# Patient Record
Sex: MALE | Race: WHITE | Employment: FULL TIME | URBAN - METROPOLITAN AREA
[De-identification: names, ages, dates, MRNs, and addresses within clinical notes are randomized per-mention and may not be internally consistent; named-entity substitution may affect disease eponyms.]

---

## 2017-05-29 ENCOUNTER — HOSPITAL ENCOUNTER (EMERGENCY)
Facility: HOSPITAL | Age: 52
Discharge: HOME | End: 2017-05-29
Attending: EMERGENCY MEDICINE
Payer: COMMERCIAL

## 2017-05-29 VITALS
HEIGHT: 70 IN | BODY MASS INDEX: 27.06 KG/M2 | WEIGHT: 189 LBS | RESPIRATION RATE: 19 BRPM | TEMPERATURE: 99 F | DIASTOLIC BLOOD PRESSURE: 82 MMHG | OXYGEN SATURATION: 98 % | HEART RATE: 118 BPM | SYSTOLIC BLOOD PRESSURE: 135 MMHG

## 2017-05-29 DIAGNOSIS — J18.9 PNEUMONIA OF RIGHT MIDDLE LOBE DUE TO INFECTIOUS ORGANISM: Primary | ICD-10-CM

## 2017-05-29 LAB
ALBUMIN SERPL BCP-MCNC: 3.4 G/DL
ALP SERPL-CCNC: 88 U/L
ALT SERPL W/O P-5'-P-CCNC: 11 U/L
ANION GAP SERPL CALC-SCNC: 12 MMOL/L
AST SERPL-CCNC: 9 U/L
B-OH-BUTYR BLD STRIP-SCNC: 0.1 MMOL/L
BACTERIA #/AREA URNS AUTO: NORMAL /HPF
BASOPHILS # BLD AUTO: 0.02 K/UL
BASOPHILS NFR BLD: 0.2 %
BILIRUB SERPL-MCNC: 0.6 MG/DL
BILIRUB UR QL STRIP: NEGATIVE
BNP SERPL-MCNC: 26 PG/ML
BUN SERPL-MCNC: 12 MG/DL
CALCIUM SERPL-MCNC: 10 MG/DL
CHLORIDE SERPL-SCNC: 102 MMOL/L
CLARITY UR REFRACT.AUTO: CLEAR
CO2 SERPL-SCNC: 24 MMOL/L
COLOR UR AUTO: YELLOW
CREAT SERPL-MCNC: 1.2 MG/DL
DIFFERENTIAL METHOD: ABNORMAL
EOSINOPHIL # BLD AUTO: 0.1 K/UL
EOSINOPHIL NFR BLD: 0.7 %
ERYTHROCYTE [DISTWIDTH] IN BLOOD BY AUTOMATED COUNT: 12.1 %
EST. GFR  (AFRICAN AMERICAN): >60 ML/MIN/1.73 M^2
EST. GFR  (NON AFRICAN AMERICAN): >60 ML/MIN/1.73 M^2
GLUCOSE SERPL-MCNC: 278 MG/DL
GLUCOSE UR QL STRIP: ABNORMAL
HCT VFR BLD AUTO: 38.1 %
HGB BLD-MCNC: 13.4 G/DL
HGB UR QL STRIP: ABNORMAL
INR PPP: 1.1
KETONES UR QL STRIP: NEGATIVE
LACTATE SERPL-SCNC: 1.8 MMOL/L
LEUKOCYTE ESTERASE UR QL STRIP: NEGATIVE
LYMPHOCYTES # BLD AUTO: 2.1 K/UL
LYMPHOCYTES NFR BLD: 16.7 %
MCH RBC QN AUTO: 30.5 PG
MCHC RBC AUTO-ENTMCNC: 35.2 %
MCV RBC AUTO: 87 FL
MICROSCOPIC COMMENT: NORMAL
MONOCYTES # BLD AUTO: 0.7 K/UL
MONOCYTES NFR BLD: 5.8 %
NEUTROPHILS # BLD AUTO: 9.4 K/UL
NEUTROPHILS NFR BLD: 76.4 %
NITRITE UR QL STRIP: NEGATIVE
PH UR STRIP: 5 [PH] (ref 5–8)
PLATELET # BLD AUTO: 243 K/UL
PMV BLD AUTO: 8.7 FL
POCT GLUCOSE: 290 MG/DL (ref 70–110)
POTASSIUM SERPL-SCNC: 4.3 MMOL/L
PROT SERPL-MCNC: 7.9 G/DL
PROT UR QL STRIP: NEGATIVE
PROTHROMBIN TIME: 12 SEC
RBC # BLD AUTO: 4.39 M/UL
RBC #/AREA URNS AUTO: 1 /HPF (ref 0–4)
SODIUM SERPL-SCNC: 138 MMOL/L
SP GR UR STRIP: >=1.03 (ref 1–1.03)
TROPONIN I SERPL DL<=0.01 NG/ML-MCNC: <0.006 NG/ML
URN SPEC COLLECT METH UR: ABNORMAL
UROBILINOGEN UR STRIP-ACNC: NEGATIVE EU/DL
WBC # BLD AUTO: 12.34 K/UL
WBC #/AREA URNS AUTO: 0 /HPF (ref 0–5)
YEAST UR QL AUTO: NORMAL

## 2017-05-29 PROCEDURE — 80053 COMPREHEN METABOLIC PANEL: CPT

## 2017-05-29 PROCEDURE — 93010 ELECTROCARDIOGRAM REPORT: CPT | Mod: ,,, | Performed by: INTERNAL MEDICINE

## 2017-05-29 PROCEDURE — 83605 ASSAY OF LACTIC ACID: CPT

## 2017-05-29 PROCEDURE — 63600175 PHARM REV CODE 636 W HCPCS: Performed by: EMERGENCY MEDICINE

## 2017-05-29 PROCEDURE — 85610 PROTHROMBIN TIME: CPT

## 2017-05-29 PROCEDURE — 83880 ASSAY OF NATRIURETIC PEPTIDE: CPT

## 2017-05-29 PROCEDURE — 25000003 PHARM REV CODE 250: Performed by: EMERGENCY MEDICINE

## 2017-05-29 PROCEDURE — 93005 ELECTROCARDIOGRAM TRACING: CPT

## 2017-05-29 PROCEDURE — 99285 EMERGENCY DEPT VISIT HI MDM: CPT | Mod: ,,, | Performed by: EMERGENCY MEDICINE

## 2017-05-29 PROCEDURE — 84484 ASSAY OF TROPONIN QUANT: CPT

## 2017-05-29 PROCEDURE — 85025 COMPLETE CBC W/AUTO DIFF WBC: CPT

## 2017-05-29 PROCEDURE — 82010 KETONE BODYS QUAN: CPT

## 2017-05-29 PROCEDURE — 96361 HYDRATE IV INFUSION ADD-ON: CPT

## 2017-05-29 PROCEDURE — 99284 EMERGENCY DEPT VISIT MOD MDM: CPT | Mod: 25

## 2017-05-29 PROCEDURE — 81001 URINALYSIS AUTO W/SCOPE: CPT

## 2017-05-29 PROCEDURE — 82962 GLUCOSE BLOOD TEST: CPT

## 2017-05-29 PROCEDURE — 96365 THER/PROPH/DIAG IV INF INIT: CPT

## 2017-05-29 RX ORDER — AZITHROMYCIN 250 MG/1
250 TABLET, FILM COATED ORAL DAILY
Qty: 6 TABLET | Refills: 0 | Status: ON HOLD | OUTPATIENT
Start: 2017-05-29 | End: 2018-12-17 | Stop reason: HOSPADM

## 2017-05-29 RX ORDER — BENZONATATE 100 MG/1
100 CAPSULE ORAL 3 TIMES DAILY PRN
Qty: 20 CAPSULE | Refills: 4 | Status: SHIPPED | OUTPATIENT
Start: 2017-05-29 | End: 2017-06-08

## 2017-05-29 RX ORDER — SODIUM CHLORIDE 9 MG/ML
1000 INJECTION, SOLUTION INTRAVENOUS
Status: COMPLETED | OUTPATIENT
Start: 2017-05-29 | End: 2017-05-29

## 2017-05-29 RX ORDER — AZITHROMYCIN 250 MG/1
500 TABLET, FILM COATED ORAL
Status: COMPLETED | OUTPATIENT
Start: 2017-05-29 | End: 2017-05-29

## 2017-05-29 RX ADMIN — SODIUM CHLORIDE 1000 ML: 0.9 INJECTION, SOLUTION INTRAVENOUS at 12:05

## 2017-05-29 RX ADMIN — AZITHROMYCIN 500 MG: 250 TABLET, FILM COATED ORAL at 01:05

## 2017-05-29 RX ADMIN — CEFTRIAXONE 1 G: 1 INJECTION, SOLUTION INTRAVENOUS at 01:05

## 2017-05-29 NOTE — ED PROVIDER NOTES
Encounter Date: 5/29/2017    SCRIBE #1 NOTE: I, Mateo Mann, am scribing for, and in the presence of,  Dr. Cao. I have scribed the entire note.       History     Chief Complaint   Patient presents with    Cough     Review of patient's allergies indicates:  No Known Allergies  Time patient was seen by the provider: 11:59 AM      The patient is a 51 y.o. male that presents to the ED with a complaint of persistent non productive cough, which began 4 weeks ago. He notes an associated shortness of breath, right sided chest discomfort, unexpected weight loss(5 pounds), and fatigue. Pt also reports his blood sugar at 336 after checking it this morning. He denies any fever, ear pain, hearing loss, vision changes, facial swelling, nosebleed, hemoptysis, or scrotal swelling. He endorses a family history significant for father with MI and CABG at 35. No history of tobacco abuse.      The history is provided by the patient.     No past medical history on file.  No past surgical history on file.  No family history on file.  Social History   Substance Use Topics    Smoking status: Not on file    Smokeless tobacco: Not on file    Alcohol use Not on file     Review of Systems   Constitutional: Positive for fatigue and unexpected weight change. Negative for fever.   HENT: Negative for ear pain, hearing loss, nosebleeds and sore throat.    Respiratory: Positive for cough and shortness of breath.         No hemoptysis    Cardiovascular: Positive for chest pain.   Gastrointestinal: Negative for nausea.   Genitourinary: Negative for dysuria and scrotal swelling.   Musculoskeletal: Negative for back pain.   Skin: Negative for rash.   Neurological: Negative for weakness.   Hematological: Does not bruise/bleed easily.       Physical Exam     Initial Vitals [05/29/17 1125]   BP Pulse Resp Temp SpO2   135/82 (!) 118 19 98.9 °F (37.2 °C) 98 %     Physical Exam    Nursing note and vitals reviewed.  Constitutional:   Uncomfortable  appearing, but alert and cooperative    HENT:   Head: Normocephalic.   Mouth/Throat: No posterior oropharyngeal erythema.   Dry oral mucosa. No scalp tenderness    Neck: Neck supple. No JVD present.   Cardiovascular: Regular rhythm.   Tachycardic    Pulmonary/Chest: Breath sounds normal. No respiratory distress. He has no wheezes.   Abdominal: Soft. There is no tenderness.   Musculoskeletal: Normal range of motion. He exhibits no edema or tenderness.   Neurological: He is alert and oriented to person, place, and time.   Skin: Skin is warm and dry.         ED Course   Procedures  Labs Reviewed   CBC W/ AUTO DIFFERENTIAL - Abnormal; Notable for the following:        Result Value    RBC 4.39 (*)     Hemoglobin 13.4 (*)     Hematocrit 38.1 (*)     MPV 8.7 (*)     Gran # 9.4 (*)     Gran% 76.4 (*)     Lymph% 16.7 (*)     All other components within normal limits   COMPREHENSIVE METABOLIC PANEL - Abnormal; Notable for the following:     Glucose 278 (*)     Albumin 3.4 (*)     AST 9 (*)     All other components within normal limits   URINALYSIS - Abnormal; Notable for the following:     Specific Gravity, UA >=1.030 (*)     Glucose, UA 3+ (*)     Occult Blood UA Trace (*)     All other components within normal limits    Narrative:     1 CUP OF URINE   POCT GLUCOSE - Abnormal; Notable for the following:     POCT Glucose 290 (*)     All other components within normal limits   LACTIC ACID, PLASMA   B-TYPE NATRIURETIC PEPTIDE   TROPONIN I   PROTIME-INR   BETA - HYDROXYBUTYRATE, SERUM   URINALYSIS MICROSCOPIC    Narrative:     1 CUP OF URINE     EKG Readings: (Independently Interpreted)   Heart rate 106, sinus tachycardia, no STEMI       X-Rays:   Independently Interpreted Readings:   Chest X-Ray: Patchy region in the aspect of the right middle lobe very suggestive for pneumonia      Medical Decision Making:   History:   Old Medical Records: I decided to obtain old medical records.  Initial Assessment:   51 y.o. male who works  at a [employer] who has been pulling extra hours more so. Pt presents with a 4 week history of persistent cough and chest tightness. Now with shortness of breath with exertion. Concerned recently because of the development of anterior chest discomfort just to the right of the midline anteriorly. Denies any hemoptysis or sputum production. Father with strong cardiac history of an MI when he ws in his 30s with bypass. Pt also concerned because he took blood sugar which was in the 300s range. My initial differential diagnoses include but are not limited to: pneumonia, bronchitis, pericarditis, doubt cardiac.   Independently Interpreted Test(s):   I have ordered and independently interpreted X-rays - see prior notes.  I have ordered and independently interpreted EKG Reading(s) - see prior notes  Clinical Tests:   Lab Tests: Ordered and Reviewed  Radiological Study: Reviewed and Ordered  Medical Tests: Ordered and Reviewed            Scribe Attestation:   Scribe #1: I performed the above scribed service and the documentation accurately describes the services I performed. I attest to the accuracy of the note.    Attending Attestation:           Physician Attestation for Scribe:  Physician Attestation Statement for Scribe #1: I, Dr. Cao, reviewed documentation, as scribed by Mateo Mann in my presence, and it is both accurate and complete.         Attending ED Notes:   Pt walked through the department with pulse ox. He continued to have a good pulse ox in the 93-95 range however his heart rate continued climbing to 126.       Chest x-ray shows a patchy region in the aspect of the right middle lobe very suggestive for pneumonia. Will give antibiotics to the pt starting off with ceftriaxone 1 gram. Will also give azithromycin 500 mg now.     Pt presenting to the ED with complaint of cough and mild dehydration over 2 weeks. Pt evaluated with labs showing a slightly elevated blood sugar also with a pneumonia of  right middle lobe. Will treat as community acquired. He received IV rocephin and will be given azithromycin. He is discharged on tessalon perles and a prescription for azithromycin and referred to the internal medicine clinic. Discharge in good condition.            ED Course     Clinical Impression:   The encounter diagnosis was Pneumonia of right middle lobe due to infectious organism.    Disposition:   Disposition: Discharged  Condition: Stable       Jan Cao MD  06/14/17 1020

## 2017-05-29 NOTE — ED NOTES
"The patient came to the ER today for a cough. States he has been sick for about 4 weeks. Became worse about 5 days ago. cbg this am was in the 300s. Pt on metformin. Pt has "bouts of coughing" and "feels short of breath". Cough is worse when awake. Cough is non productive. Pt feels a pain in the chest "when he tries to breathe all the way in". Pain on the right side of the chest. Patient afebrile. Pt states "I'm pretty sure I have pneumonia or im in [place]". Pt denies being in [place] before  "

## 2018-05-19 ENCOUNTER — HOSPITAL ENCOUNTER (EMERGENCY)
Facility: HOSPITAL | Age: 53
Discharge: HOME | End: 2018-05-19
Attending: EMERGENCY MEDICINE
Payer: COMMERCIAL

## 2018-05-19 VITALS
BODY MASS INDEX: 26.77 KG/M2 | HEIGHT: 70 IN | SYSTOLIC BLOOD PRESSURE: 139 MMHG | DIASTOLIC BLOOD PRESSURE: 81 MMHG | WEIGHT: 187 LBS | OXYGEN SATURATION: 96 % | HEART RATE: 76 BPM | RESPIRATION RATE: 18 BRPM | TEMPERATURE: 97 F

## 2018-05-19 DIAGNOSIS — S61.011A LACERATION OF RIGHT THUMB WITHOUT FOREIGN BODY WITHOUT DAMAGE TO NAIL, INITIAL ENCOUNTER: Primary | ICD-10-CM

## 2018-05-19 PROCEDURE — 99283 EMERGENCY DEPT VISIT LOW MDM: CPT | Mod: 25

## 2018-05-19 PROCEDURE — 63600175 PHARM REV CODE 636 W HCPCS: Performed by: EMERGENCY MEDICINE

## 2018-05-19 PROCEDURE — 12001 RPR S/N/AX/GEN/TRNK 2.5CM/<: CPT | Mod: F5

## 2018-05-19 PROCEDURE — 25000003 PHARM REV CODE 250: Performed by: EMERGENCY MEDICINE

## 2018-05-19 PROCEDURE — 90471 IMMUNIZATION ADMIN: CPT | Performed by: EMERGENCY MEDICINE

## 2018-05-19 PROCEDURE — 90715 TDAP VACCINE 7 YRS/> IM: CPT | Performed by: EMERGENCY MEDICINE

## 2018-05-19 RX ORDER — BACITRACIN ZINC 500 UNIT/G
1 OINTMENT IN PACKET (EA) TOPICAL
Status: COMPLETED | OUTPATIENT
Start: 2018-05-19 | End: 2018-05-19

## 2018-05-19 RX ORDER — CEPHALEXIN 500 MG/1
500 CAPSULE ORAL EVERY 12 HOURS
Qty: 10 CAPSULE | Refills: 0 | Status: SHIPPED | OUTPATIENT
Start: 2018-05-19 | End: 2018-05-24

## 2018-05-19 RX ORDER — LIDOCAINE HYDROCHLORIDE 10 MG/ML
10 INJECTION INFILTRATION; PERINEURAL ONCE
Status: COMPLETED | OUTPATIENT
Start: 2018-05-19 | End: 2018-05-19

## 2018-05-19 RX ADMIN — BACITRACIN 1 EACH: 500 OINTMENT TOPICAL at 06:05

## 2018-05-19 RX ADMIN — LIDOCAINE HYDROCHLORIDE 10 ML: 10 INJECTION, SOLUTION INFILTRATION; PERINEURAL at 06:05

## 2018-05-19 RX ADMIN — CLOSTRIDIUM TETANI TOXOID ANTIGEN (FORMALDEHYDE INACTIVATED), CORYNEBACTERIUM DIPHTHERIAE TOXOID ANTIGEN (FORMALDEHYDE INACTIVATED), BORDETELLA PERTUSSIS TOXOID ANTIGEN (GLUTARALDEHYDE INACTIVATED), BORDETELLA PERTUSSIS FILAMENTOUS HEMAGGLUTININ ANTIGEN (FORMALDEHYDE INACTIVATED), BORDETELLA PERTUSSIS PERTACTIN ANTIGEN, AND BORDETELLA PERTUSSIS FIMBRIAE 2/3 ANTIGEN 0.5 ML: 5; 2; 2.5; 5; 3; 5 INJECTION, SUSPENSION INTRAMUSCULAR at 06:05

## 2018-05-19 NOTE — ED TRIAGE NOTES
Presents to ER with a jagged laceration to the tip of his right thumb from a mitre saw earlier today.  Patient's name and date of birth checked and is correct.  LOC: The patient is awake, alert and aware of environment with an appropriate affect, the patient is oriented x 3 and speaking appropriately.  APPEARANCE: Patient resting comfortably and in no acute distress, patient is clean and well groomed, patient's clothing is properly fastened.  CARDIOVASCULAR:  Heart rate regular and even with no peripheral edema noted.  SKIN: The skin is warm and dry, patient has normal skin turgor and moist mucus membranes.   MUSKULOSKELETAL: Patient moving all extremities well, no obvious swelling or deformities noted.  RESPIRATORY: Airway is open and patent, respirations are spontaneous, patient has a normal effort and rate.

## 2018-05-19 NOTE — ED PROVIDER NOTES
Encounter Date: 5/19/2018    SCRIBE #1 NOTE: I, Mateo Mann, am scribing for, and in the presence of,  Dr. Mason. I have scribed the entire note.       History     Chief Complaint   Patient presents with    Laceration     Pt with a laceration to the right thumb after cutting it on a saw.     Time patient was seen by the provider: 6:25 PM      The patient is a 52 y.o. male who presents to the ED with a complaint of right thumb laceration, which began 30 minutes following circular saw injury.  Pain rated at a 3/10 currently. No other injury.  He has a 1.5 cm stellate laceration to the volar aspect of his right thumb. Mild amount of bleeding. No hand deformity or any other injuries. Onset was sudden associated with pain, bleeding from laceration site. No other aggravating or alleviating factors. Duration is continuous. He is right handed. Unknown tetanus status.       The history is provided by the patient and medical records.     Review of patient's allergies indicates:  No Known Allergies  History reviewed. No pertinent past medical history.  History reviewed. No pertinent surgical history.  Family History   Problem Relation Age of Onset    Heart disease Mother      Social History   Substance Use Topics    Smoking status: Never Smoker    Smokeless tobacco: Never Used    Alcohol use No     Review of Systems   Constitutional: Negative for fever.   HENT: Negative for sore throat.    Respiratory: Negative for shortness of breath.    Cardiovascular: Negative for chest pain.   Gastrointestinal: Negative for nausea.   Genitourinary: Negative for dysuria.   Musculoskeletal: Negative for back pain.   Skin: Negative for rash.        Thumb laceration    Neurological: Negative for weakness.   Hematological: Does not bruise/bleed easily.       Physical Exam     Initial Vitals [05/19/18 1805]   BP Pulse Resp Temp SpO2   139/81 76 18 97.2 °F (36.2 °C) 96 %      MAP       100.33         Physical Exam    Nursing note and  vitals reviewed.    Gen/Constitutional: Interactive. No acute distress  Head: Normocephalic, Atraumatic  Neck: supple, no masses or LAD  Eyes: PERRLA, conjunctiva clear  Ears, Nose and Throat: No rhinorrhea or stridor.  Cardiac: Reg Rhythm, No murmur  Pulmonary: CTA Bilat, no wheezes, rhonchi, rales.  GI: Abdomen soft, non-tender, non-distended; no rebound or guarding  : No CVA tenderness.  Musculoskeletal: Extremities warm, well perfused, no erythema, no edema  Right thumb has a 2 cm laceration extending from the dorsal tip below the nail bed, extending volarly with a stellate laceration. Strong radial pulse. Thumb intact to dorsiflexion, flexion, and extension   Skin: No rashes, as above  Neuro: Alert and Oriented x 3; No focal motor or sensory deficits.    Psych: Normal affect      ED Course   Lac Repair  Date/Time: 5/19/2018 7:56 PM  Performed by: AMOS LAZO  Authorized by: AMOS LAZO   Consent Done: Emergent Situation  Body area: upper extremity  Location details: right thumb  Laceration length: 2 cm  Tendon involvement: none  Nerve involvement: none  Anesthesia: nerve block    Anesthesia:  Local Anesthetic: lidocaine 1% without epinephrine  Anesthetic total: 3 mL  Preparation: Patient was prepped and draped in the usual sterile fashion.  Irrigation solution: saline  Irrigation method: jet lavage and syringe  Amount of cleaning: standard  Skin closure: 5-0 nylon  Number of sutures: 8  Technique: simple  Approximation: close  Approximation difficulty: complex  Dressing: 4x4 sterile gauze, antibiotic ointment, bulky dressing, dressing applied, gauze roll, non-stick sterile dressing and pressure dressing  Patient tolerance: Patient tolerated the procedure well with no immediate complications        Labs Reviewed - No data to display     Imaging Results          X-Ray Finger 2 or More Views Right (Final result)  Result time 05/19/18 18:42:58    Final result by Dieudonne Miguel MD (05/19/18 18:42:58)                  Impression:      1. Soft tissue defect involving the distal thumb soft tissues, correlation for open injury/nailbed injury clinically, no underlying osseous abnormality.  2. Possible remote triquetrum injury versus ossicle.  Correlation recommended.      Electronically signed by: Dieudonne Miguel MD  Date:    05/19/2018  Time:    18:42             Narrative:    EXAMINATION:  XR FINGER 2 OR MORE VIEWS RIGHT    CLINICAL HISTORY:  thumb laceration eval for maki fx;    COMPARISON:  None    FINDINGS:  Three views right thumb.    No acute displaced fracture or dislocation of the thumb.  No radiopaque foreign body.  There is a soft tissue defect involving the distal aspect of the thumb soft tissues, correlation clinically for open injury/nailbed injury.  There is a well corticated ossific fragment along the posterior aspect of the wrist, could reflect sequela of previous triquetrum fracture.                                   Medical Decision Making:   History:   Old Medical Records: I decided to obtain old medical records.  Initial Assessment:   52 y.o. male presents with right thumb laceration. My initial differential diagnoses include but are not limited to: tuft fracture, laceration, nail bed avulsion, nail bed laceration, vascular damage.    On exam, stellate lac with bleeding controlled. Xray neg for fx or dislocation. No nailbed involvement. Complex lac repair as above with stellate lac and jagged edges. Will have patient f/u with pcp in 1 week for wound check and suture removal. Strict ED precautions and return instructions were given. Keflex given for 1 week as prophylaxis. Tetanus booster admin as well. No other red flags for any other acute or emergent traumatic injuries. Neurovascular, and sensory/motor intact.   Clinical Tests:   Radiological Study: Ordered and Reviewed            Scribe Attestation:   Scribe #1: I performed the above scribed service and the documentation accurately describes  the services I performed. I attest to the accuracy of the note.    I, Dr. Lester Mason, personally performed the services described in this documentation. All medical record entries made by the scribe were at my direction and in my presence.  I have reviewed the chart and agree that the record reflects my personal performance and is accurate and complete. Lester Mason DO.  9:23 AM 05/22/2018                 Clinical Impression:   The encounter diagnosis was Laceration of right thumb without foreign body without damage to nail, initial encounter.    Disposition:   Disposition: Discharged  Condition: Stable    Lester Mason DO  Dept of Emergency Medicine   [facility]  Spectralink: [id number]                      Lester Mason DO  05/22/18 0928

## 2018-05-20 NOTE — DISCHARGE INSTRUCTIONS
Please keep wound clean, covered, and dry for next 36 hrs. Then take down dressing and look for evidence of infection (redness, swelling, discharge). If there is none, please place antibiotic cream and change wound bandages twice a day. Please keep wound covered w/ gauze for next week until sutures removed, then can cover with band-aid. Please return to ED or follow up with your primary care MD in 7-10 days for suture removal. Please return to the ED if you notice the development of redness, swelling, drainage, increased pain, fever, or any new or worrisome symptoms.  Take all of your antibiotics.

## 2018-12-14 ENCOUNTER — HOSPITAL ENCOUNTER (INPATIENT)
Facility: HOSPITAL | Age: 53
LOS: 3 days | Discharge: HOME | DRG: 247 | End: 2018-12-17
Attending: EMERGENCY MEDICINE | Admitting: INTERNAL MEDICINE
Payer: COMMERCIAL

## 2018-12-14 DIAGNOSIS — I21.3 ST ELEVATION MYOCARDIAL INFARCTION (STEMI), UNSPECIFIED ARTERY: Primary | ICD-10-CM

## 2018-12-14 DIAGNOSIS — I44.1 MOBITZ TYPE 1 SECOND DEGREE AV BLOCK: ICD-10-CM

## 2018-12-14 DIAGNOSIS — R07.9 CHEST PAIN: ICD-10-CM

## 2018-12-14 DIAGNOSIS — E11.9 DIABETES MELLITUS WITHOUT COMPLICATION: ICD-10-CM

## 2018-12-14 DIAGNOSIS — Z98.61 POSTSURGICAL PERCUTANEOUS TRANSLUMINAL CORONARY ANGIOPLASTY STATUS: Primary | ICD-10-CM

## 2018-12-14 DIAGNOSIS — I21.3 ST ELEVATION MYOCARDIAL INFARCTION (STEMI): ICD-10-CM

## 2018-12-14 DIAGNOSIS — E66.3 OVERWEIGHT (BMI 25.0-29.9): ICD-10-CM

## 2018-12-14 DIAGNOSIS — I21.3 STEMI (ST ELEVATION MYOCARDIAL INFARCTION): ICD-10-CM

## 2018-12-14 DIAGNOSIS — I44.1 2ND DEGREE AV BLOCK: ICD-10-CM

## 2018-12-14 LAB
ABO + RH BLD: NORMAL
ALBUMIN SERPL BCP-MCNC: 4.2 G/DL
ALP SERPL-CCNC: 68 U/L
ALT SERPL W/O P-5'-P-CCNC: 15 U/L
ANION GAP SERPL CALC-SCNC: 9 MMOL/L
AST SERPL-CCNC: 15 U/L
BASOPHILS # BLD AUTO: 0.03 K/UL
BASOPHILS NFR BLD: 0.3 %
BILIRUB SERPL-MCNC: 0.6 MG/DL
BLD GP AB SCN CELLS X3 SERPL QL: NORMAL
BNP SERPL-MCNC: 67 PG/ML
BUN SERPL-MCNC: 14 MG/DL
CALCIUM SERPL-MCNC: 9.1 MG/DL
CHLORIDE SERPL-SCNC: 101 MMOL/L
CO2 SERPL-SCNC: 23 MMOL/L
CREAT SERPL-MCNC: 1.2 MG/DL
DIFFERENTIAL METHOD: ABNORMAL
EOSINOPHIL # BLD AUTO: 0.1 K/UL
EOSINOPHIL NFR BLD: 0.7 %
ERYTHROCYTE [DISTWIDTH] IN BLOOD BY AUTOMATED COUNT: 12.9 %
EST. GFR  (AFRICAN AMERICAN): >60 ML/MIN/1.73 M^2
EST. GFR  (NON AFRICAN AMERICAN): >60 ML/MIN/1.73 M^2
GLUCOSE SERPL-MCNC: 276 MG/DL
HCT VFR BLD AUTO: 37.6 %
HGB BLD-MCNC: 12.9 G/DL
IMM GRANULOCYTES # BLD AUTO: 0.03 K/UL
IMM GRANULOCYTES NFR BLD AUTO: 0.3 %
INR PPP: 1
LYMPHOCYTES # BLD AUTO: 4.4 K/UL
LYMPHOCYTES NFR BLD: 49.8 %
MCH RBC QN AUTO: 31.1 PG
MCHC RBC AUTO-ENTMCNC: 34.3 G/DL
MCV RBC AUTO: 91 FL
MONOCYTES # BLD AUTO: 0.5 K/UL
MONOCYTES NFR BLD: 5.3 %
NEUTROPHILS # BLD AUTO: 3.9 K/UL
NEUTROPHILS NFR BLD: 43.6 %
NRBC BLD-RTO: 0 /100 WBC
PLATELET # BLD AUTO: 265 K/UL
PMV BLD AUTO: 8.8 FL
POTASSIUM SERPL-SCNC: 4.3 MMOL/L
PROT SERPL-MCNC: 7.4 G/DL
PROTHROMBIN TIME: 10.5 SEC
RBC # BLD AUTO: 4.15 M/UL
SODIUM SERPL-SCNC: 133 MMOL/L
TROPONIN I SERPL DL<=0.01 NG/ML-MCNC: 0.05 NG/ML
TROPONIN I SERPL DL<=0.01 NG/ML-MCNC: 0.22 NG/ML
WBC # BLD AUTO: 8.85 K/UL

## 2018-12-14 PROCEDURE — 02C03ZZ EXTIRPATION OF MATTER FROM CORONARY ARTERY, ONE ARTERY, PERCUTANEOUS APPROACH: ICD-10-PCS | Performed by: INTERNAL MEDICINE

## 2018-12-14 PROCEDURE — 25000003 PHARM REV CODE 250: Performed by: STUDENT IN AN ORGANIZED HEALTH CARE EDUCATION/TRAINING PROGRAM

## 2018-12-14 PROCEDURE — 25000003 PHARM REV CODE 250: Performed by: INTERNAL MEDICINE

## 2018-12-14 PROCEDURE — 25000003 PHARM REV CODE 250: Performed by: EMERGENCY MEDICINE

## 2018-12-14 PROCEDURE — B2151ZZ FLUOROSCOPY OF LEFT HEART USING LOW OSMOLAR CONTRAST: ICD-10-PCS | Performed by: INTERNAL MEDICINE

## 2018-12-14 PROCEDURE — 99153 MOD SED SAME PHYS/QHP EA: CPT | Performed by: INTERNAL MEDICINE

## 2018-12-14 PROCEDURE — 93005 ELECTROCARDIOGRAM TRACING: CPT

## 2018-12-14 PROCEDURE — 80053 COMPREHEN METABOLIC PANEL: CPT

## 2018-12-14 PROCEDURE — 84484 ASSAY OF TROPONIN QUANT: CPT | Mod: 91

## 2018-12-14 PROCEDURE — C1874 STENT, COATED/COV W/DEL SYS: HCPCS | Performed by: INTERNAL MEDICINE

## 2018-12-14 PROCEDURE — 92928 PRQ TCAT PLMT NTRAC ST 1 LES: CPT | Mod: RC,,, | Performed by: INTERNAL MEDICINE

## 2018-12-14 PROCEDURE — 5A1213Z PERFORMANCE OF CARDIAC PACING, INTERMITTENT: ICD-10-PCS | Performed by: INTERNAL MEDICINE

## 2018-12-14 PROCEDURE — C1760 CLOSURE DEV, VASC: HCPCS | Performed by: INTERNAL MEDICINE

## 2018-12-14 PROCEDURE — 93010 ELECTROCARDIOGRAM REPORT: CPT | Mod: ,,, | Performed by: INTERNAL MEDICINE

## 2018-12-14 PROCEDURE — 86901 BLOOD TYPING SEROLOGIC RH(D): CPT

## 2018-12-14 PROCEDURE — 25000003 PHARM REV CODE 250

## 2018-12-14 PROCEDURE — C1769 GUIDE WIRE: HCPCS | Performed by: INTERNAL MEDICINE

## 2018-12-14 PROCEDURE — C1757 CATH, THROMBECTOMY/EMBOLECT: HCPCS | Performed by: INTERNAL MEDICINE

## 2018-12-14 PROCEDURE — 93010 ELECTROCARDIOGRAM REPORT: CPT | Mod: 76,59,, | Performed by: INTERNAL MEDICINE

## 2018-12-14 PROCEDURE — C1894 INTRO/SHEATH, NON-LASER: HCPCS | Performed by: INTERNAL MEDICINE

## 2018-12-14 PROCEDURE — 63600175 PHARM REV CODE 636 W HCPCS: Performed by: INTERNAL MEDICINE

## 2018-12-14 PROCEDURE — 99152 MOD SED SAME PHYS/QHP 5/>YRS: CPT | Performed by: INTERNAL MEDICINE

## 2018-12-14 PROCEDURE — C1887 CATHETER, GUIDING: HCPCS | Performed by: INTERNAL MEDICINE

## 2018-12-14 PROCEDURE — 99152 MOD SED SAME PHYS/QHP 5/>YRS: CPT | Mod: ,,, | Performed by: INTERNAL MEDICINE

## 2018-12-14 PROCEDURE — 85610 PROTHROMBIN TIME: CPT

## 2018-12-14 PROCEDURE — 93454 CORONARY ARTERY ANGIO S&I: CPT | Mod: 59 | Performed by: INTERNAL MEDICINE

## 2018-12-14 PROCEDURE — 27201423 OPTIME MED/SURG SUP & DEVICES STERILE SUPPLY: Performed by: INTERNAL MEDICINE

## 2018-12-14 PROCEDURE — 99233 SBSQ HOSP IP/OBS HIGH 50: CPT | Mod: ,,, | Performed by: INTERNAL MEDICINE

## 2018-12-14 PROCEDURE — 4A023N7 MEASUREMENT OF CARDIAC SAMPLING AND PRESSURE, LEFT HEART, PERCUTANEOUS APPROACH: ICD-10-PCS | Performed by: INTERNAL MEDICINE

## 2018-12-14 PROCEDURE — 63600175 PHARM REV CODE 636 W HCPCS: Performed by: STUDENT IN AN ORGANIZED HEALTH CARE EDUCATION/TRAINING PROGRAM

## 2018-12-14 PROCEDURE — 83880 ASSAY OF NATRIURETIC PEPTIDE: CPT

## 2018-12-14 PROCEDURE — 027034Z DILATION OF CORONARY ARTERY, ONE ARTERY WITH DRUG-ELUTING INTRALUMINAL DEVICE, PERCUTANEOUS APPROACH: ICD-10-PCS | Performed by: INTERNAL MEDICINE

## 2018-12-14 PROCEDURE — 99285 EMERGENCY DEPT VISIT HI MDM: CPT | Mod: ,,, | Performed by: EMERGENCY MEDICINE

## 2018-12-14 PROCEDURE — C1725 CATH, TRANSLUMIN NON-LASER: HCPCS | Performed by: INTERNAL MEDICINE

## 2018-12-14 PROCEDURE — 99285 EMERGENCY DEPT VISIT HI MDM: CPT | Mod: 25

## 2018-12-14 PROCEDURE — 85025 COMPLETE CBC W/AUTO DIFF WBC: CPT

## 2018-12-14 PROCEDURE — 93454 CORONARY ARTERY ANGIO S&I: CPT | Mod: 26,59,, | Performed by: INTERNAL MEDICINE

## 2018-12-14 PROCEDURE — 20000000 HC ICU ROOM

## 2018-12-14 PROCEDURE — C9600 PERC DRUG-EL COR STENT SING: HCPCS | Mod: RC | Performed by: INTERNAL MEDICINE

## 2018-12-14 DEVICE — EVEROLIMUS-ELUTING PLATINUM CHROMIUM CORONARY STENT SYSTEM
Type: IMPLANTABLE DEVICE | Site: HEART | Status: FUNCTIONAL
Brand: PROMUS ELITE™

## 2018-12-14 RX ORDER — LIDOCAINE HCL/PF 100 MG/5ML
SYRINGE (ML) INTRAVENOUS
Status: DISCONTINUED | OUTPATIENT
Start: 2018-12-14 | End: 2018-12-14 | Stop reason: HOSPADM

## 2018-12-14 RX ORDER — ASPIRIN 325 MG
325 TABLET ORAL
Status: COMPLETED | OUTPATIENT
Start: 2018-12-14 | End: 2018-12-14

## 2018-12-14 RX ORDER — ACETAMINOPHEN 325 MG/1
650 TABLET ORAL EVERY 6 HOURS PRN
Status: DISCONTINUED | OUTPATIENT
Start: 2018-12-14 | End: 2018-12-17 | Stop reason: HOSPADM

## 2018-12-14 RX ORDER — DIPHENHYDRAMINE HCL 50 MG
50 CAPSULE ORAL
Status: COMPLETED | OUTPATIENT
Start: 2018-12-14 | End: 2018-12-14

## 2018-12-14 RX ORDER — NAPROXEN SODIUM 220 MG/1
81 TABLET, FILM COATED ORAL DAILY
Status: DISCONTINUED | OUTPATIENT
Start: 2018-12-15 | End: 2018-12-17 | Stop reason: HOSPADM

## 2018-12-14 RX ORDER — PHENYLEPHRINE HYDROCHLORIDE 10 MG/ML
INJECTION INTRAVENOUS
Status: DISCONTINUED | OUTPATIENT
Start: 2018-12-14 | End: 2018-12-14 | Stop reason: HOSPADM

## 2018-12-14 RX ORDER — NAPROXEN SODIUM 220 MG/1
81 TABLET, FILM COATED ORAL DAILY
Status: DISCONTINUED | OUTPATIENT
Start: 2018-12-15 | End: 2018-12-15

## 2018-12-14 RX ORDER — HEPARIN SODIUM 5000 [USP'U]/ML
5000 INJECTION, SOLUTION INTRAVENOUS; SUBCUTANEOUS EVERY 8 HOURS
Status: DISCONTINUED | OUTPATIENT
Start: 2018-12-14 | End: 2018-12-17 | Stop reason: HOSPADM

## 2018-12-14 RX ORDER — ATROPINE SULFATE 0.1 MG/ML
INJECTION INTRAVENOUS
Status: DISCONTINUED | OUTPATIENT
Start: 2018-12-14 | End: 2018-12-14 | Stop reason: HOSPADM

## 2018-12-14 RX ORDER — ONDANSETRON 2 MG/ML
4 INJECTION INTRAMUSCULAR; INTRAVENOUS EVERY 12 HOURS PRN
Status: DISCONTINUED | OUTPATIENT
Start: 2018-12-14 | End: 2018-12-17 | Stop reason: HOSPADM

## 2018-12-14 RX ORDER — NITROGLYCERIN 0.4 MG/1
0.4 TABLET SUBLINGUAL EVERY 5 MIN PRN
Status: DISCONTINUED | OUTPATIENT
Start: 2018-12-14 | End: 2018-12-17 | Stop reason: HOSPADM

## 2018-12-14 RX ORDER — SODIUM CHLORIDE 0.9 G/100ML
IRRIGANT IRRIGATION
Status: DISCONTINUED | OUTPATIENT
Start: 2018-12-14 | End: 2018-12-14 | Stop reason: HOSPADM

## 2018-12-14 RX ORDER — PROMETHAZINE HYDROCHLORIDE 12.5 MG/1
12.5 TABLET ORAL EVERY 6 HOURS PRN
Status: DISCONTINUED | OUTPATIENT
Start: 2018-12-14 | End: 2018-12-17 | Stop reason: HOSPADM

## 2018-12-14 RX ORDER — NITROGLYCERIN 5 MG/ML
INJECTION, SOLUTION INTRAVENOUS
Status: DISCONTINUED | OUTPATIENT
Start: 2018-12-14 | End: 2018-12-14 | Stop reason: HOSPADM

## 2018-12-14 RX ORDER — FENTANYL CITRATE 50 UG/ML
INJECTION, SOLUTION INTRAMUSCULAR; INTRAVENOUS
Status: DISCONTINUED | OUTPATIENT
Start: 2018-12-14 | End: 2018-12-14 | Stop reason: HOSPADM

## 2018-12-14 RX ORDER — NICARDIPINE HYDROCHLORIDE 2.5 MG/ML
INJECTION INTRAVENOUS
Status: DISCONTINUED | OUTPATIENT
Start: 2018-12-14 | End: 2018-12-14 | Stop reason: HOSPADM

## 2018-12-14 RX ORDER — SODIUM CHLORIDE 9 MG/ML
INJECTION, SOLUTION INTRAVENOUS
Status: DISCONTINUED | OUTPATIENT
Start: 2018-12-14 | End: 2018-12-15

## 2018-12-14 RX ORDER — CEFAZOLIN SODIUM 1 G/3ML
INJECTION, POWDER, FOR SOLUTION INTRAMUSCULAR; INTRAVENOUS
Status: DISCONTINUED | OUTPATIENT
Start: 2018-12-14 | End: 2018-12-14 | Stop reason: HOSPADM

## 2018-12-14 RX ORDER — ATORVASTATIN CALCIUM 20 MG/1
40 TABLET, FILM COATED ORAL DAILY
Status: DISCONTINUED | OUTPATIENT
Start: 2018-12-14 | End: 2018-12-15

## 2018-12-14 RX ORDER — ATORVASTATIN CALCIUM 20 MG/1
80 TABLET, FILM COATED ORAL DAILY
Status: DISCONTINUED | OUTPATIENT
Start: 2018-12-15 | End: 2018-12-17 | Stop reason: HOSPADM

## 2018-12-14 RX ORDER — ATROPINE SULFATE 0.1 MG/ML
0.5 INJECTION INTRAVENOUS
Status: DISCONTINUED | OUTPATIENT
Start: 2018-12-14 | End: 2018-12-17 | Stop reason: HOSPADM

## 2018-12-14 RX ORDER — HEPARIN SODIUM 1000 [USP'U]/ML
INJECTION, SOLUTION INTRAVENOUS; SUBCUTANEOUS
Status: DISCONTINUED | OUTPATIENT
Start: 2018-12-14 | End: 2018-12-14 | Stop reason: HOSPADM

## 2018-12-14 RX ORDER — MIDAZOLAM HYDROCHLORIDE 1 MG/ML
INJECTION, SOLUTION INTRAMUSCULAR; INTRAVENOUS
Status: DISCONTINUED | OUTPATIENT
Start: 2018-12-14 | End: 2018-12-14 | Stop reason: HOSPADM

## 2018-12-14 RX ADMIN — ATORVASTATIN CALCIUM 40 MG: 20 TABLET, FILM COATED ORAL at 11:12

## 2018-12-14 RX ADMIN — TICAGRELOR 90 MG: 90 TABLET ORAL at 08:12

## 2018-12-14 RX ADMIN — ACETAMINOPHEN 650 MG: 325 TABLET ORAL at 09:12

## 2018-12-14 RX ADMIN — SODIUM CHLORIDE 3 ML/KG/HR: 0.9 INJECTION, SOLUTION INTRAVENOUS at 11:12

## 2018-12-14 RX ADMIN — ONDANSETRON 4 MG: 2 INJECTION INTRAMUSCULAR; INTRAVENOUS at 07:12

## 2018-12-14 RX ADMIN — TICAGRELOR 180 MG: 90 TABLET ORAL at 09:12

## 2018-12-14 RX ADMIN — DIPHENHYDRAMINE HYDROCHLORIDE 50 MG: 50 CAPSULE ORAL at 09:12

## 2018-12-14 RX ADMIN — ASPIRIN 325 MG ORAL TABLET 325 MG: 325 PILL ORAL at 08:12

## 2018-12-14 RX ADMIN — PROMETHAZINE HYDROCHLORIDE 12.5 MG: 12.5 TABLET ORAL at 03:12

## 2018-12-14 RX ADMIN — HEPARIN SODIUM 5000 UNITS: 5000 INJECTION, SOLUTION INTRAVENOUS; SUBCUTANEOUS at 09:12

## 2018-12-14 NOTE — PROCEDURES
"            Interventional Cardiology   Post Cath Note      Referring Physician: No att. providers found  Procedure: LHC+PCI  Indication: STEMI    SUBJECTIVE:   Patient tolerated procedure well with no complications. S/p PCI of RCA please see full cath report for more details.   Cath Results:   Access:  R CFA and R CFV  See full cath report for further details    Intervention:     Closure device used: Per close  Patient tolerated the procedure well, no complications    Post Cath Exam:   BP (!) 173/100   Pulse 102   Temp 97.9 °F (36.6 °C) (Oral)   Resp 18   Ht 5' 11" (1.803 m)   Wt 88.5 kg (195 lb)   SpO2 98%   BMI 27.20 kg/m²     No unusual pain, hematoma, thrill or bruit at vascular access site.  Distal pulse present without signs of ischemia.    Assessment / Plan:     - S/P PCI RCA with  (after thrombectomy using Pronto and angiojet) please see full report.  - Aspirin and Ticagrelor for 1 year then aspirin for life.  - High intensity statin.  - Cardiac rehab.  - 2D echo.  - Routine post cath protocol.  - IVFs for YESICA prevention.  - Maximize medical management.  - Further care by the primary team.  - Discussed with CCU Fellow.     Attending addendum to follow     Live Nicole MD  Cardiology Fellow  Pager: [phone]        "

## 2018-12-14 NOTE — Clinical Note
Catheter is repositioned to the ostium left main artery. The vessel(s) were injected and visualized in multiple views.

## 2018-12-14 NOTE — SUBJECTIVE & OBJECTIVE
History reviewed. No pertinent past medical history.    History reviewed. No pertinent surgical history.    Review of patient's allergies indicates:  No Known Allergies    No current facility-administered medications on file prior to encounter.      Current Outpatient Medications on File Prior to Encounter   Medication Sig    azithromycin (Z-FACUNDO) 250 MG tablet Take 1 tablet (250 mg total) by mouth once daily. Take first 2 tablets together, then 1 every day until finished.    diclofenac (VOLTAREN) 75 MG EC tablet Take 1 tablet (75 mg total) by mouth 2 (two) times daily.    metformin (GLUCOPHAGE) 1000 MG tablet Take 1,000 mg by mouth daily with breakfast.     Family History     Problem Relation (Age of Onset)    Heart disease Mother        Tobacco Use    Smoking status: Never Smoker    Smokeless tobacco: Never Used   Substance and Sexual Activity    Alcohol use: No    Drug use: No    Sexual activity: Yes     Partners: Female     Review of Systems   All other systems reviewed and are negative.    Objective:     Vital Signs (Most Recent):  Temp: 97.6 °F (36.4 °C) (12/14/18 1335)  Pulse: (!) 59 (12/14/18 1335)  Resp: (!) 23 (12/14/18 1335)  BP: 94/61 (12/14/18 1335)  SpO2: 97 % (12/14/18 1300) Vital Signs (24h Range):  Temp:  [97.5 °F (36.4 °C)-97.9 °F (36.6 °C)] 97.6 °F (36.4 °C)  Pulse:  [] 59  Resp:  [12-23] 23  SpO2:  [97 %-99 %] 97 %  BP: ()/() 94/61     Weight: 90.6 kg (199 lb 11.8 oz)  Body mass index is 27.86 kg/m².    SpO2: 97 %  O2 Device (Oxygen Therapy): room air      Intake/Output Summary (Last 24 hours) at 12/14/2018 1526  Last data filed at 12/14/2018 1300  Gross per 24 hour   Intake 796.5 ml   Output 425 ml   Net 371.5 ml       Lines/Drains/Airways     Peripheral Intravenous Line                 Peripheral IV - Single Lumen 12/14/18 0855 Right Antecubital less than 1 day         Peripheral IV - Single Lumen 12/14/18 0859 Right Hand less than 1 day                Physical Exam    Constitutional: He is oriented to person, place, and time. No distress.   HENT:   Head: Atraumatic.   Mouth/Throat: No oropharyngeal exudate.   Eyes: EOM are normal. No scleral icterus.   Neck: Neck supple. No JVD present.   Cardiovascular: Normal rate, regular rhythm and normal heart sounds. Exam reveals no gallop and no friction rub.   No murmur heard.  Pulmonary/Chest: Effort normal and breath sounds normal. No respiratory distress. He has no wheezes. He has no rales. He exhibits no tenderness.   Abdominal: Soft. Bowel sounds are normal. He exhibits no distension. There is no tenderness.   Musculoskeletal: He exhibits no edema.   Neurological: He is alert and oriented to person, place, and time. No cranial nerve deficit.   Skin: Skin is warm and dry. No erythema.   Psychiatric: He has a normal mood and affect.       Significant Labs:   BMP:   Recent Labs   Lab 18  0855   *   *   K 4.3      CO2 23   BUN 14   CREATININE 1.2   CALCIUM 9.1   , CMP   Recent Labs   Lab 18  0855   *   K 4.3      CO2 23   *   BUN 14   CREATININE 1.2   CALCIUM 9.1   PROT 7.4   ALBUMIN 4.2   BILITOT 0.6   ALKPHOS 68   AST 15   ALT 15   ANIONGAP 9   ESTGFRAFRICA >60.0   EGFRNONAA >60.0   , CBC   Recent Labs   Lab 18  0855   WBC 8.85   HGB 12.9*   HCT 37.6*      , INR   Recent Labs   Lab 18  0858   INR 1.0    and Lipid Panel No results for input(s): CHOL, HDL, LDLCALC, TRIG, CHOLHDL in the last 48 hours.    Significant Imaging: Echocardiogram: 2D echo with color flow doppler: No results found for this or any previous visit. and EKG: initial EKG NSR, inferior TALIA. Post EKnd degree Mobitz type 1 AVB, normal ST segments

## 2018-12-14 NOTE — H&P
[facility]  Cardiology  History and Physical     Patient Name: Aj Cuellar  MRN: [id number]  Admission Date: 12/14/2018  Code Status: No Order   Attending Provider: Fabián Mar MD   Primary Care Physician: Primary Doctor No  Principal Problem:ST elevation myocardial infarction (STEMI)    Patient information was obtained from patient, past medical records and ER records.     Subjective:     Chief Complaint:  Chest pain     HPI:  Mr. Cuellar is a 54 yo healthy man with a family hx of CAD (father MI in 30s), family hx of HOCM (mother, 2 kids) presenting with chest pain for 2 hours at home, found to have inferior STEMI. CP was substernal, heavy, 10/10, radiating to his right arm, associated with nausea. He was emergently taken to the cath lab. There, he was found to have acute RCA occlusion. He underwent aspiration thrombecotmy, PCI/ successfully without complication. He was thereafter transferred to the ICU.     Here, he is CP free. Mild nausea. No SOB, orthopnea, SUSANA. No prior hx of ACS/MI, CHF. Nonsmoker. No known hx of DM, HLD.     History reviewed. No significant PMH, PSH.    Review of patient's allergies indicates:  No Known Allergies    No current facility-administered medications on file prior to encounter.      Current Outpatient Medications on File Prior to Encounter   Medication Sig    azithromycin (Z-FACUNDO) 250 MG tablet Take 1 tablet (250 mg total) by mouth once daily. Take first 2 tablets together, then 1 every day until finished.    diclofenac (VOLTAREN) 75 MG EC tablet Take 1 tablet (75 mg total) by mouth 2 (two) times daily.    metformin (GLUCOPHAGE) 1000 MG tablet Take 1,000 mg by mouth daily with breakfast.     Family History     Problem Relation (Age of Onset)    Heart disease Mother        Tobacco Use    Smoking status: Never Smoker    Smokeless tobacco: Never Used   Substance and Sexual Activity    Alcohol use: No    Drug use: No    Sexual activity: Yes      Partners: Female     Review of Systems   All other systems reviewed and are negative.    Objective:     Vital Signs (Most Recent):  Temp: 97.6 °F (36.4 °C) (12/14/18 1335)  Pulse: (!) 59 (12/14/18 1335)  Resp: (!) 23 (12/14/18 1335)  BP: 94/61 (12/14/18 1335)  SpO2: 97 % (12/14/18 1300) Vital Signs (24h Range):  Temp:  [97.5 °F (36.4 °C)-97.9 °F (36.6 °C)] 97.6 °F (36.4 °C)  Pulse:  [] 59  Resp:  [12-23] 23  SpO2:  [97 %-99 %] 97 %  BP: ()/() 94/61     Weight: 90.6 kg (199 lb 11.8 oz)  Body mass index is 27.86 kg/m².    SpO2: 97 %  O2 Device (Oxygen Therapy): room air      Intake/Output Summary (Last 24 hours) at 12/14/2018 1526  Last data filed at 12/14/2018 1300  Gross per 24 hour   Intake 796.5 ml   Output 425 ml   Net 371.5 ml       Lines/Drains/Airways     Peripheral Intravenous Line                 Peripheral IV - Single Lumen 12/14/18 0855 Right Antecubital less than 1 day         Peripheral IV - Single Lumen 12/14/18 0859 Right Hand less than 1 day                Physical Exam   Constitutional: He is oriented to person, place, and time. No distress.   HENT:   Head: Atraumatic.   Mouth/Throat: No oropharyngeal exudate.   Eyes: EOM are normal. No scleral icterus.   Neck: Neck supple. No JVD present.   Cardiovascular: Normal rate, regular rhythm and normal heart sounds. Exam reveals no gallop and no friction rub.   No murmur heard.  Pulmonary/Chest: Effort normal and breath sounds normal. No respiratory distress. He has no wheezes. He has no rales. He exhibits no tenderness.   Abdominal: Soft. Bowel sounds are normal. He exhibits no distension. There is no tenderness.   Musculoskeletal: He exhibits no edema.   Neurological: He is alert and oriented to person, place, and time. No cranial nerve deficit.   Skin: Skin is warm and dry. No erythema.   Psychiatric: He has a normal mood and affect.       Significant Labs:   BMP:   Recent Labs   Lab 12/14/18  0855   *   *   K 4.3       CO2 23   BUN 14   CREATININE 1.2   CALCIUM 9.1   , CMP   Recent Labs   Lab 18  0855   *   K 4.3      CO2 23   *   BUN 14   CREATININE 1.2   CALCIUM 9.1   PROT 7.4   ALBUMIN 4.2   BILITOT 0.6   ALKPHOS 68   AST 15   ALT 15   ANIONGAP 9   ESTGFRAFRICA >60.0   EGFRNONAA >60.0   , CBC   Recent Labs   Lab 18  0855   WBC 8.85   HGB 12.9*   HCT 37.6*      , INR   Recent Labs   Lab 18  0858   INR 1.0    and Lipid Panel No results for input(s): CHOL, HDL, LDLCALC, TRIG, CHOLHDL in the last 48 hours.    Significant Imaging: Echocardiogram: 2D echo with color flow doppler: No results found for this or any previous visit. and EKG: initial EKG NSR, inferior TALIA. Post EKnd degree Mobitz type 1 AVB, normal ST segments    Assessment and Plan:     * ST elevation myocardial infarction (STEMI)    53M previously healthy with family hx of early CAD and HOCM presenting with RCA STEMI s/p successful PCI.    -ASA for life  -Brilinta for at least one year  -High intensity statin  -Hold BB given 2nd deg Wenckebach AVB, will monitor for resolution  -ACE/ARB if low EF  -TTE  -A1c, lipid panel  -Nonsmoker  -Cardiac rehab referral     Wenckebach second degree AV block    Secondary to ischemia  Will monitor for resolution with successful PCI           VTE Risk Mitigation (From admission, onward)    None          Cezar Mistry MD  Cardiology   [facility]

## 2018-12-14 NOTE — ASSESSMENT & PLAN NOTE
53M previously healthy with family hx of early CAD and HOCM presenting with RCA STEMI s/p successful PCI.    -ASA for life  -Brilinta for at least one year  -High intensity statin  -Hold BB given 2nd deg Filippo PARRA, will monitor for resolution  -ACE/ARB if low EF  -TTE  -A1c, lipid panel  -Nonsmoker  -Cardiac rehab referral

## 2018-12-14 NOTE — HPI
Mr. Cuellar is a 54 yo healthy man with a family hx of CAD (father MI in 30s), family hx of HOCM (mother, 2 kids) presenting with chest pain for 2 hours at home, found to have inferior STEMI. CP was substernal, heavy, 10/10, radiating to his right arm, associated with nausea. He was emergently taken to the cath lab. There, he was found to have acute RCA occlusion. He underwent aspiration thrombecotmy, PCI/ successfully without complication. He was thereafter transferred to the ICU.     Here, he is CP free. Mild nausea. No SOB, orthopnea, SUSANA. No prior hx of ACS/MI, CHF. Nonsmoker. No known hx of DM, HLD.

## 2018-12-14 NOTE — HPI
Mr Cuellar is a 53 y.o male with family history of CAD (his father in his 30's) presented to ED with chest pain for 2 hours and was found to have inferior STEMI. Patient mentioned he has chest pain for 1 week intermittently but today pain was constant for 2 hours. He said pain is substernal and radiate to his right arm. Denied any fever, chills, recent surgeries.

## 2018-12-14 NOTE — ED PROVIDER NOTES
Encounter Date: 12/14/2018    SCRIBE #1 NOTE: I, Mai Vasques, am scribing for, and in the presence of,  Dr. Mccracken . I have scribed the entire note. the EKG reading.       History     Chief Complaint   Patient presents with    Chest Pain     Time patient was seen by the provider: 8:53 AM      The patient is a 53 y.o. male who presents to the ED with a complaint of chest pain. Patient reports left sided chest pain x 1 week, but worsened this morning, with radiation to left arm. He also endorses SOB. He denies nausea. He notes he is prediabetic and family history of MI, father at age 37. Denies smoking      The history is provided by the patient and medical records.     Review of patient's allergies indicates:  No Known Allergies  History reviewed. No pertinent past medical history.  History reviewed. No pertinent surgical history.  Family History   Problem Relation Age of Onset    Heart disease Mother      Social History     Tobacco Use    Smoking status: Never Smoker    Smokeless tobacco: Never Used   Substance Use Topics    Alcohol use: No    Drug use: No     Review of Systems  General: No fever.  No chills.  Eyes: No visual changes.  Head: No headache.    Integument: No rashes or lesions.  Chest: Positive shortness of breath.  Cardiovascular: Positive left sided chest pain radiating to left arm.  Abdomen: No abdominal pain.  No nausea or vomiting.  Urinary: No abnormal urination.  Neurologic: No focal weakness.  No numbness.  Hematologic: No easy bruising.  Endocrine: No excessive thirst or urination.   Physical Exam     Initial Vitals [12/14/18 0844]   BP Pulse Resp Temp SpO2   136/60 100 18 97.9 °F (36.6 °C) 98 %      MAP       --         Physical Exam    Nursing note and vitals reviewed.    Appearance: Pale, uncomfortable.  Skin: No rashes seen.  Good turgor.  No abrasions.  No ecchymoses.  Eyes: No conjunctival injection.  ENT: Oropharynx clear.    Chest: Clear to auscultation bilaterally.  Good air  movement.  No wheezes.  No rhonchi.  Cardiovascular: Regular rate and rhythm.  No murmurs. No gallops. No rubs.  Abdomen: Soft.  Not distended.  Nontender.  No guarding.  No rebound.  Musculoskeletal: Good range of motion all joints.  No deformities.  Neck supple.  No meningismus.  Neurologic: Motor intact.  Sensation intact.  Cerebellar intact.  Cranial nerves intact.  Mental Status:  Alert and oriented x 3.  Appropriate, conversant.     ED Course   Procedures  Labs Reviewed     EKG Readings: (Independently Interpreted)   8:51 AM - ST depression in lateral leads  8:54 AM - Inferior STEMI       Imaging Results    None          Medical Decision Making:   Initial Assessment:   Initial EKG concerning, but not definitive, repeat EKG 3 minutes after showed STEMI. Code STEMI activated via protocol. I also spoke with Cardiology fellow directly. He will be admitted for CATH.   Independently Interpreted Test(s):   I have ordered and independently interpreted EKG Reading(s) - see prior notes  Clinical Tests:   Lab Tests: Ordered and Reviewed  Medical Tests: Ordered and Reviewed            Scribe Attestation:   Scribe #1: I performed the above scribed service and the documentation accurately describes the services I performed. I attest to the accuracy of the note.               Clinical Impression:   The primary encounter diagnosis was ST elevation myocardial infarction (STEMI), unspecified artery. A diagnosis of Chest pain was also pertinent to this visit.      Disposition:   Disposition: Admitted                        Aden Mccracken MD  12/14/18 3993

## 2018-12-14 NOTE — Clinical Note
125 ml injected throughout the case. 75 mL total wasted during the case. 200 mL total used in the case.

## 2018-12-14 NOTE — Clinical Note
Catheter is inserted into the ostium right coronary artery. The vessel(s) were injected and visualized post intervention.

## 2018-12-14 NOTE — CONSULTS
[facility]  Interventional Cardiology  Consult Note    Patient Name: Aj Cuellar  MRN: [id number]  Admission Date: 12/14/2018  Hospital Length of Stay: 0 days  Code Status: No Order   Attending Provider: No att. providers found  Consulting Provider: Live Nicole MD  Primary Care Physician: No primary care provider on file.  Principal Problem:<principal problem not specified>    Patient information was obtained from patient and ER records.     Consults  Subjective:     Chief Complaint:  Chest pain      HPI:  Mr Cuellar is a 53 y.o male with family history of CAD (his father in his 30's) presented to ED with chest pain for 2 hours and was found to have inferior STEMI. Patient mentioned he has chest pain for 1 week intermittently but today pain was constant for 2 hours. He said pain is substernal and radiate to his right arm. Denied any fever, chills, recent surgeries.    History reviewed. No pertinent past medical history.    History reviewed. No pertinent surgical history.    Review of patient's allergies indicates:  No Known Allergies    PTA Medications   Medication Sig    azithromycin (Z-FACUNDO) 250 MG tablet Take 1 tablet (250 mg total) by mouth once daily. Take first 2 tablets together, then 1 every day until finished.    diclofenac (VOLTAREN) 75 MG EC tablet Take 1 tablet (75 mg total) by mouth 2 (two) times daily.    metformin (GLUCOPHAGE) 1000 MG tablet Take 1,000 mg by mouth daily with breakfast.     Family History     Problem Relation (Age of Onset)    Heart disease Mother        Tobacco Use    Smoking status: Never Smoker    Smokeless tobacco: Never Used   Substance and Sexual Activity    Alcohol use: No    Drug use: No    Sexual activity: Yes     Partners: Female     Review of Systems   Cardiovascular: Positive for chest pain.   All other systems reviewed and are negative.    Objective:     Vital Signs (Most Recent):  Temp: 97.9 °F (36.6 °C) (12/14/18 0844)  Pulse: 102 (12/14/18  0854)  Resp: 18 (12/14/18 0844)  BP: (!) 173/100 (12/14/18 0854)  SpO2: 98 % (12/14/18 0856) Vital Signs (24h Range):  Temp:  [97.9 °F (36.6 °C)] 97.9 °F (36.6 °C)  Pulse:  [100-102] 102  Resp:  [18] 18  SpO2:  [98 %] 98 %  BP: (136-173)/() 173/100     Weight: 88.5 kg (195 lb)  Body mass index is 27.2 kg/m².    SpO2: 98 %  O2 Device (Oxygen Therapy): nasal cannula    No intake or output data in the 24 hours ending 12/14/18 1024    Lines/Drains/Airways     Peripheral Intravenous Line                 Peripheral IV - Single Lumen 12/14/18 0855 Right Antecubital less than 1 day         Peripheral IV - Single Lumen 12/14/18 0859 Right Hand less than 1 day                Physical Exam   Constitutional: He is oriented to person, place, and time. He appears well-developed.   Eyes: Pupils are equal, round, and reactive to light.   Neck: Normal range of motion.   Abdominal: Soft.   Musculoskeletal: Normal range of motion.   Neurological: He is alert and oriented to person, place, and time.   Skin: Skin is warm.       Significant Labs:   BMP:   Recent Labs   Lab 12/14/18  0855   *   *   K 4.3      CO2 23   BUN 14   CREATININE 1.2   CALCIUM 9.1   , CMP   Recent Labs   Lab 12/14/18  0855   *   K 4.3      CO2 23   *   BUN 14   CREATININE 1.2   CALCIUM 9.1   PROT 7.4   ALBUMIN 4.2   BILITOT 0.6   ALKPHOS 68   AST 15   ALT 15   ANIONGAP 9   ESTGFRAFRICA >60.0   EGFRNONAA >60.0    and CBC   Recent Labs   Lab 12/14/18  0855   WBC 8.85   HGB 12.9*   HCT 37.6*            Assessment and Plan:     STEMI (ST elevation myocardial infarction)    - [facility] +/- PCI.  -  Candidate.  - Loaded with aspirin and Ticagrelor.  - R CFA access.  - The risks, benefits & alternatives of the procedure were explained to the patient.   - The risks of coronary angiography include but are not limited to: Bleeding, infection, heart rhythm abnormalities, allergic reactions, kidney injury requiring dilaysis,  stroke and death.   - Should stenting be indicated, the patient has agreed to dual anti-platelet therapy for 1-consecutive year with a drug-eluting stent.  - The risks of moderate sedation include hypotension, respiratory depression, arrhythmias, bronchospasm, & death.   - Informed consent was obtained & the patient is agreeable to proceed with the procedure.           Thank you for your consult, please call cariology for any question.     Live Nicole MD  Cardiology Fellow  Pager: [phone]

## 2018-12-14 NOTE — CONSULTS
"Cardiac Rehab     Aj Cuellar   [id number]   12/14/2018       Activity taught: Yes    Cardiac Rehab Phase Taught: Phase I & II    Risk Factors-Modifiable:  nutrition, stress, exercise    Risk Factors-Non modifiable: age, gender    Teaching Method: Verbal, Written and Living with Heart Disease book.    Understanding/Response: Wife verbalized understanding, all questions answered. Wife understands their cardiac rehab order is good for 12 months.       Comments: S/P PTCA. Discussed cardiac rehab and risk factor modification. Team to refer patient to [facility] Phase II. Educational materials were used in the process and given to the patient. They included "Your Guide to Living with Heart Disease", Phase Two Cardiac Rehabilitation information along with a sample Mediterranean diet.The patient expressed understanding of the teaching and expressed desire to take a role in modifying the risk factors when they return home.    GABY Manrique RN  Cardiac Rehab Nurse      "

## 2018-12-14 NOTE — SUBJECTIVE & OBJECTIVE
History reviewed. No pertinent past medical history.    History reviewed. No pertinent surgical history.    Review of patient's allergies indicates:  No Known Allergies    PTA Medications   Medication Sig    azithromycin (Z-FACUNDO) 250 MG tablet Take 1 tablet (250 mg total) by mouth once daily. Take first 2 tablets together, then 1 every day until finished.    diclofenac (VOLTAREN) 75 MG EC tablet Take 1 tablet (75 mg total) by mouth 2 (two) times daily.    metformin (GLUCOPHAGE) 1000 MG tablet Take 1,000 mg by mouth daily with breakfast.     Family History     Problem Relation (Age of Onset)    Heart disease Mother        Tobacco Use    Smoking status: Never Smoker    Smokeless tobacco: Never Used   Substance and Sexual Activity    Alcohol use: No    Drug use: No    Sexual activity: Yes     Partners: Female     Review of Systems   Cardiovascular: Positive for chest pain.   All other systems reviewed and are negative.    Objective:     Vital Signs (Most Recent):  Temp: 97.9 °F (36.6 °C) (12/14/18 0844)  Pulse: 102 (12/14/18 0854)  Resp: 18 (12/14/18 0844)  BP: (!) 173/100 (12/14/18 0854)  SpO2: 98 % (12/14/18 0856) Vital Signs (24h Range):  Temp:  [97.9 °F (36.6 °C)] 97.9 °F (36.6 °C)  Pulse:  [100-102] 102  Resp:  [18] 18  SpO2:  [98 %] 98 %  BP: (136-173)/() 173/100     Weight: 88.5 kg (195 lb)  Body mass index is 27.2 kg/m².    SpO2: 98 %  O2 Device (Oxygen Therapy): nasal cannula    No intake or output data in the 24 hours ending 12/14/18 1024    Lines/Drains/Airways     Peripheral Intravenous Line                 Peripheral IV - Single Lumen 12/14/18 0855 Right Antecubital less than 1 day         Peripheral IV - Single Lumen 12/14/18 0859 Right Hand less than 1 day                Physical Exam   Constitutional: He is oriented to person, place, and time. He appears well-developed.   Eyes: Pupils are equal, round, and reactive to light.   Neck: Normal range of motion.   Abdominal: Soft.    Musculoskeletal: Normal range of motion.   Neurological: He is alert and oriented to person, place, and time.   Skin: Skin is warm.       Significant Labs:   BMP:   Recent Labs   Lab 12/14/18  0855   *   *   K 4.3      CO2 23   BUN 14   CREATININE 1.2   CALCIUM 9.1   , CMP   Recent Labs   Lab 12/14/18  0855   *   K 4.3      CO2 23   *   BUN 14   CREATININE 1.2   CALCIUM 9.1   PROT 7.4   ALBUMIN 4.2   BILITOT 0.6   ALKPHOS 68   AST 15   ALT 15   ANIONGAP 9   ESTGFRAFRICA >60.0   EGFRNONAA >60.0    and CBC   Recent Labs   Lab 12/14/18  0855   WBC 8.85   HGB 12.9*   HCT 37.6*

## 2018-12-14 NOTE — ASSESSMENT & PLAN NOTE
- [facility] +/- PCI.  -  Candidate.  - Loaded with aspirin and Ticagrelor.  - R CFA access.  - The risks, benefits & alternatives of the procedure were explained to the patient.   - The risks of coronary angiography include but are not limited to: Bleeding, infection, heart rhythm abnormalities, allergic reactions, kidney injury requiring dilaysis, stroke and death.   - Should stenting be indicated, the patient has agreed to dual anti-platelet therapy for 1-consecutive year with a drug-eluting stent.  - The risks of moderate sedation include hypotension, respiratory depression, arrhythmias, bronchospasm, & death.   - Informed consent was obtained & the patient is agreeable to proceed with the procedure.

## 2018-12-14 NOTE — ED NOTES
Pt placed in gown, hooked to monitor and placed on stemi translucent pads and wires. Pt ready for cath lab

## 2018-12-14 NOTE — Clinical Note
Catheter  ostium right coronary artery. The vessel(s) were injected and visualized post intervention.

## 2018-12-14 NOTE — ED TRIAGE NOTES
Aj Cuellar, a 53 y.o. male presents to the ED via POV c/o chest pain that started last night.  Pt reports L sided chest pain that radiates to his L under arm. Pt states that he has medical history.  Pt denies NV but is nauseated.       Chief Complaint   Patient presents with    Chest Pain     Review of patient's allergies indicates:  No Known Allergies  History reviewed. No pertinent past medical history.    LOC: Patient name and date of birth verified. The patient is awake, alert and aware of environment with an appropriate affect, the patient is oriented x 3 and speaking appropriately.   APPEARANCE: Patient resting comfortably, patient is clean and well groomed, patient's clothing is properly fastened.  SKIN: The skin is warm and dry, color consistent with ethnicity, patient has normal skin turgor and moist mucus membranes, skin intact, no breakdown or bruising noted.  MUSCULOSKELETAL: Patient moving all extremities well, no obvious swelling or deformities noted.   RESPIRATORY: Respirations are spontaneous, patient has a normal effort and rate, no accessory muscle use noted.  CARDIAC: Patient has a normal rate and rhythm, no periphreal edema noted, capillary refill < 3 seconds. Chest pain to the L sided that raidates to his L under arm   ABDOMEN: Soft and non tender to palpation, no distention noted. Bowel sounds present in all four quadrants.  NEUROLOGIC: Eyes open spontaneously, behavior appropriate to situation, follows commands, facial expression symmetrical, bilateral hand grasp equal and even, purposeful motor response noted, normal sensation in all extremities when touched with a finger.

## 2018-12-15 PROBLEM — E11.9 DIABETES MELLITUS WITHOUT COMPLICATION: Status: ACTIVE | Noted: 2018-12-15

## 2018-12-15 PROBLEM — E78.5 HYPERLIPIDEMIA: Status: ACTIVE | Noted: 2018-12-15

## 2018-12-15 PROBLEM — E66.3 OVERWEIGHT (BMI 25.0-29.9): Status: ACTIVE | Noted: 2018-12-15

## 2018-12-15 LAB
ANION GAP SERPL CALC-SCNC: 14 MMOL/L
ASCENDING AORTA: 3.09 CM
AV INDEX (PROSTH): 1.01
AV MEAN GRADIENT: 3.55 MMHG
AV PEAK GRADIENT: 6.15 MMHG
AV VALVE AREA: 3.62 CM2
BASOPHILS # BLD AUTO: 0.01 K/UL
BASOPHILS # BLD AUTO: 0.02 K/UL
BASOPHILS NFR BLD: 0.1 %
BASOPHILS NFR BLD: 0.2 %
BSA FOR ECHO PROCEDURE: 2.13 M2
BUN SERPL-MCNC: 21 MG/DL
CALCIUM SERPL-MCNC: 8.7 MG/DL
CHLORIDE SERPL-SCNC: 100 MMOL/L
CHOLEST SERPL-MCNC: 202 MG/DL
CHOLEST/HDLC SERPL: 4.6 {RATIO}
CO2 SERPL-SCNC: 19 MMOL/L
CREAT SERPL-MCNC: 1.3 MG/DL
CV ECHO LV RWT: 0.46 CM
DIFFERENTIAL METHOD: ABNORMAL
DIFFERENTIAL METHOD: ABNORMAL
DOP CALC AO PEAK VEL: 1.24 M/S
DOP CALC AO VTI: 20.57 CM
DOP CALC LVOT AREA: 3.59 CM2
DOP CALC LVOT DIAMETER: 2.14 CM
DOP CALC LVOT STROKE VOLUME: 74.52 CM3
DOP CALCLVOT PEAK VEL VTI: 20.73 CM
E WAVE DECELERATION TIME: 154.57 MSEC
E/A RATIO: 1
E/E' RATIO: 8
ECHO LV POSTERIOR WALL: 0.99 CM (ref 0.6–1.1)
EOSINOPHIL # BLD AUTO: 0 K/UL
EOSINOPHIL # BLD AUTO: 0 K/UL
EOSINOPHIL NFR BLD: 0 %
EOSINOPHIL NFR BLD: 0 %
ERYTHROCYTE [DISTWIDTH] IN BLOOD BY AUTOMATED COUNT: 13.2 %
ERYTHROCYTE [DISTWIDTH] IN BLOOD BY AUTOMATED COUNT: 13.4 %
EST. GFR  (AFRICAN AMERICAN): >60 ML/MIN/1.73 M^2
EST. GFR  (NON AFRICAN AMERICAN): >60 ML/MIN/1.73 M^2
ESTIMATED AVG GLUCOSE: 226 MG/DL
FRACTIONAL SHORTENING: 31 % (ref 28–44)
GLUCOSE SERPL-MCNC: 378 MG/DL
HBA1C MFR BLD HPLC: 9.5 %
HCT VFR BLD AUTO: 32.8 %
HCT VFR BLD AUTO: 33.1 %
HDLC SERPL-MCNC: 44 MG/DL
HDLC SERPL: 21.8 %
HGB BLD-MCNC: 11.4 G/DL
HGB BLD-MCNC: 11.4 G/DL
IMM GRANULOCYTES # BLD AUTO: 0.05 K/UL
IMM GRANULOCYTES # BLD AUTO: 0.08 K/UL
IMM GRANULOCYTES NFR BLD AUTO: 0.4 %
IMM GRANULOCYTES NFR BLD AUTO: 0.6 %
INTERVENTRICULAR SEPTUM: 1.13 CM (ref 0.6–1.1)
IVRT: 0.1 MSEC
LA MAJOR: 5.13 CM
LA MINOR: 5.13 CM
LA WIDTH: 3.92 CM
LDLC SERPL CALC-MCNC: 133.4 MG/DL
LEFT ATRIUM SIZE: 3.54 CM
LEFT ATRIUM VOLUME INDEX: 28.8 ML/M2
LEFT ATRIUM VOLUME: 60.51 CM3
LEFT INTERNAL DIMENSION IN SYSTOLE: 2.95 CM (ref 2.1–4)
LEFT VENTRICLE DIASTOLIC VOLUME INDEX: 38.86 ML/M2
LEFT VENTRICLE DIASTOLIC VOLUME: 81.78 ML
LEFT VENTRICLE MASS INDEX: 72.7 G/M2
LEFT VENTRICLE SYSTOLIC VOLUME INDEX: 16 ML/M2
LEFT VENTRICLE SYSTOLIC VOLUME: 33.66 ML
LEFT VENTRICULAR INTERNAL DIMENSION IN DIASTOLE: 4.27 CM (ref 3.5–6)
LEFT VENTRICULAR MASS: 152.91 G
LV LATERAL E/E' RATIO: 8
LV SEPTAL E/E' RATIO: 8
LYMPHOCYTES # BLD AUTO: 0.7 K/UL
LYMPHOCYTES # BLD AUTO: 0.9 K/UL
LYMPHOCYTES NFR BLD: 6.5 %
LYMPHOCYTES NFR BLD: 7.1 %
MAGNESIUM SERPL-MCNC: 2 MG/DL
MCH RBC QN AUTO: 31.5 PG
MCH RBC QN AUTO: 31.6 PG
MCHC RBC AUTO-ENTMCNC: 34.4 G/DL
MCHC RBC AUTO-ENTMCNC: 34.8 G/DL
MCV RBC AUTO: 91 FL
MCV RBC AUTO: 92 FL
MONOCYTES # BLD AUTO: 0.4 K/UL
MONOCYTES # BLD AUTO: 0.6 K/UL
MONOCYTES NFR BLD: 3.7 %
MONOCYTES NFR BLD: 4.5 %
MV PEAK A VEL: 0.56 M/S
MV PEAK E VEL: 0.56 M/S
NEUTROPHILS # BLD AUTO: 10 K/UL
NEUTROPHILS # BLD AUTO: 11.6 K/UL
NEUTROPHILS NFR BLD: 87.7 %
NEUTROPHILS NFR BLD: 89.2 %
NONHDLC SERPL-MCNC: 158 MG/DL
NRBC BLD-RTO: 0 /100 WBC
NRBC BLD-RTO: 0 /100 WBC
PISA TR MAX VEL: 1.02 M/S
PLATELET # BLD AUTO: 223 K/UL
PLATELET # BLD AUTO: 255 K/UL
PMV BLD AUTO: 9.2 FL
PMV BLD AUTO: 9.3 FL
POCT GLUCOSE: 244 MG/DL (ref 70–110)
POCT GLUCOSE: 270 MG/DL (ref 70–110)
POCT GLUCOSE: 328 MG/DL (ref 70–110)
POCT GLUCOSE: 339 MG/DL (ref 70–110)
POTASSIUM SERPL-SCNC: 4.6 MMOL/L
RA MAJOR: 4.4 CM
RA PRESSURE: 15 MMHG
RA WIDTH: 4.21 CM
RBC # BLD AUTO: 3.61 M/UL
RBC # BLD AUTO: 3.62 M/UL
RIGHT VENTRICULAR END-DIASTOLIC DIMENSION: 3.7 CM
RV TISSUE DOPPLER FREE WALL SYSTOLIC VELOCITY 1 (APICAL 4 CHAMBER VIEW): 9.35 M/S
SINUS: 2.97 CM
SODIUM SERPL-SCNC: 133 MMOL/L
STJ: 2.85 CM
TDI LATERAL: 0.07
TDI SEPTAL: 0.07
TDI: 0.07
TR MAX PG: 4.16 MMHG
TRICUSPID ANNULAR PLANE SYSTOLIC EXCURSION: 1.82 CM
TRIGL SERPL-MCNC: 123 MG/DL
TROPONIN I SERPL DL<=0.01 NG/ML-MCNC: 6.01 NG/ML
TV REST PULMONARY ARTERY PRESSURE: 19.16 MMHG
WBC # BLD AUTO: 11.17 K/UL
WBC # BLD AUTO: 13.2 K/UL

## 2018-12-15 PROCEDURE — 99223 1ST HOSP IP/OBS HIGH 75: CPT | Mod: ,,, | Performed by: NURSE PRACTITIONER

## 2018-12-15 PROCEDURE — 63600175 PHARM REV CODE 636 W HCPCS: Performed by: NURSE PRACTITIONER

## 2018-12-15 PROCEDURE — 80048 BASIC METABOLIC PNL TOTAL CA: CPT

## 2018-12-15 PROCEDURE — 94761 N-INVAS EAR/PLS OXIMETRY MLT: CPT

## 2018-12-15 PROCEDURE — 63600175 PHARM REV CODE 636 W HCPCS: Performed by: STUDENT IN AN ORGANIZED HEALTH CARE EDUCATION/TRAINING PROGRAM

## 2018-12-15 PROCEDURE — 25000003 PHARM REV CODE 250: Performed by: STUDENT IN AN ORGANIZED HEALTH CARE EDUCATION/TRAINING PROGRAM

## 2018-12-15 PROCEDURE — 99233 SBSQ HOSP IP/OBS HIGH 50: CPT | Mod: ,,, | Performed by: INTERNAL MEDICINE

## 2018-12-15 PROCEDURE — S5571 INSULIN DISPOS PEN 3 ML: HCPCS | Performed by: NURSE PRACTITIONER

## 2018-12-15 PROCEDURE — 83735 ASSAY OF MAGNESIUM: CPT

## 2018-12-15 PROCEDURE — 80061 LIPID PANEL: CPT

## 2018-12-15 PROCEDURE — 85025 COMPLETE CBC W/AUTO DIFF WBC: CPT

## 2018-12-15 PROCEDURE — 25000003 PHARM REV CODE 250: Performed by: INTERNAL MEDICINE

## 2018-12-15 PROCEDURE — 84484 ASSAY OF TROPONIN QUANT: CPT

## 2018-12-15 PROCEDURE — S5571 INSULIN DISPOS PEN 3 ML: HCPCS | Performed by: STUDENT IN AN ORGANIZED HEALTH CARE EDUCATION/TRAINING PROGRAM

## 2018-12-15 PROCEDURE — 20600001 HC STEP DOWN PRIVATE ROOM

## 2018-12-15 PROCEDURE — 83036 HEMOGLOBIN GLYCOSYLATED A1C: CPT

## 2018-12-15 RX ORDER — IBUPROFEN 200 MG
24 TABLET ORAL
Status: DISCONTINUED | OUTPATIENT
Start: 2018-12-15 | End: 2018-12-15

## 2018-12-15 RX ORDER — MIRTAZAPINE 15 MG/1
15 TABLET, ORALLY DISINTEGRATING ORAL NIGHTLY
Status: DISCONTINUED | OUTPATIENT
Start: 2018-12-15 | End: 2018-12-15

## 2018-12-15 RX ORDER — GLUCAGON 1 MG
1 KIT INJECTION
Status: DISCONTINUED | OUTPATIENT
Start: 2018-12-15 | End: 2018-12-17 | Stop reason: HOSPADM

## 2018-12-15 RX ORDER — INSULIN ASPART 100 [IU]/ML
1-10 INJECTION, SOLUTION INTRAVENOUS; SUBCUTANEOUS
Status: DISCONTINUED | OUTPATIENT
Start: 2018-12-15 | End: 2018-12-15

## 2018-12-15 RX ORDER — SODIUM CHLORIDE 9 MG/ML
3 INJECTION, SOLUTION INTRAVENOUS CONTINUOUS
Status: DISCONTINUED | OUTPATIENT
Start: 2018-12-15 | End: 2018-12-15

## 2018-12-15 RX ORDER — GLUCAGON 1 MG
1 KIT INJECTION
Status: DISCONTINUED | OUTPATIENT
Start: 2018-12-15 | End: 2018-12-15

## 2018-12-15 RX ORDER — IBUPROFEN 200 MG
16 TABLET ORAL
Status: DISCONTINUED | OUTPATIENT
Start: 2018-12-15 | End: 2018-12-15

## 2018-12-15 RX ORDER — IBUPROFEN 200 MG
16 TABLET ORAL
Status: DISCONTINUED | OUTPATIENT
Start: 2018-12-15 | End: 2018-12-17 | Stop reason: HOSPADM

## 2018-12-15 RX ORDER — INSULIN ASPART 100 [IU]/ML
7 INJECTION, SOLUTION INTRAVENOUS; SUBCUTANEOUS
Status: DISCONTINUED | OUTPATIENT
Start: 2018-12-15 | End: 2018-12-16

## 2018-12-15 RX ORDER — INSULIN ASPART 100 [IU]/ML
0-5 INJECTION, SOLUTION INTRAVENOUS; SUBCUTANEOUS
Status: DISCONTINUED | OUTPATIENT
Start: 2018-12-15 | End: 2018-12-17 | Stop reason: HOSPADM

## 2018-12-15 RX ORDER — IBUPROFEN 200 MG
24 TABLET ORAL
Status: DISCONTINUED | OUTPATIENT
Start: 2018-12-15 | End: 2018-12-17 | Stop reason: HOSPADM

## 2018-12-15 RX ORDER — DIPHENHYDRAMINE HCL 50 MG
50 CAPSULE ORAL ONCE
Status: DISCONTINUED | OUTPATIENT
Start: 2018-12-15 | End: 2018-12-15

## 2018-12-15 RX ADMIN — HEPARIN SODIUM 5000 UNITS: 5000 INJECTION, SOLUTION INTRAVENOUS; SUBCUTANEOUS at 05:12

## 2018-12-15 RX ADMIN — INSULIN DETEMIR 10 UNITS: 100 INJECTION, SOLUTION SUBCUTANEOUS at 12:12

## 2018-12-15 RX ADMIN — ONDANSETRON 4 MG: 2 INJECTION INTRAMUSCULAR; INTRAVENOUS at 07:12

## 2018-12-15 RX ADMIN — TICAGRELOR 90 MG: 90 TABLET ORAL at 08:12

## 2018-12-15 RX ADMIN — INSULIN ASPART 1 UNITS: 100 INJECTION, SOLUTION INTRAVENOUS; SUBCUTANEOUS at 08:12

## 2018-12-15 RX ADMIN — INSULIN ASPART 8 UNITS: 100 INJECTION, SOLUTION INTRAVENOUS; SUBCUTANEOUS at 10:12

## 2018-12-15 RX ADMIN — MIRTAZAPINE 15 MG: 15 TABLET, ORALLY DISINTEGRATING ORAL at 11:12

## 2018-12-15 RX ADMIN — HEPARIN SODIUM 5000 UNITS: 5000 INJECTION, SOLUTION INTRAVENOUS; SUBCUTANEOUS at 03:12

## 2018-12-15 RX ADMIN — HEPARIN SODIUM 5000 UNITS: 5000 INJECTION, SOLUTION INTRAVENOUS; SUBCUTANEOUS at 08:12

## 2018-12-15 RX ADMIN — PROMETHAZINE HYDROCHLORIDE 12.5 MG: 12.5 TABLET ORAL at 04:12

## 2018-12-15 RX ADMIN — ASPIRIN 81 MG CHEWABLE TABLET 81 MG: 81 TABLET CHEWABLE at 08:12

## 2018-12-15 RX ADMIN — ATORVASTATIN CALCIUM 80 MG: 20 TABLET, FILM COATED ORAL at 08:12

## 2018-12-15 RX ADMIN — INSULIN DETEMIR 10 UNITS: 100 INJECTION, SOLUTION SUBCUTANEOUS at 08:12

## 2018-12-15 RX ADMIN — INSULIN ASPART 7 UNITS: 100 INJECTION, SOLUTION INTRAVENOUS; SUBCUTANEOUS at 05:12

## 2018-12-15 RX ADMIN — INSULIN ASPART 3 UNITS: 100 INJECTION, SOLUTION INTRAVENOUS; SUBCUTANEOUS at 05:12

## 2018-12-15 RX ADMIN — INSULIN ASPART 10 UNITS: 100 INJECTION, SOLUTION INTRAVENOUS; SUBCUTANEOUS at 08:12

## 2018-12-15 NOTE — ASSESSMENT & PLAN NOTE
Secondary to ischemia  Will monitor for resolution with successful PCI  Present but improved conduction today

## 2018-12-15 NOTE — CONSULTS
Consult received earlier today and completed for nutrition education on newly diagnosed diabetes patient.    Olamide Rod RD

## 2018-12-15 NOTE — HPI
Reason for Consult: Management of T2DM, Hyperglycemia     Surgical Procedure and Date: PCI for STEMI 12/14/18    Diabetes diagnosis year: 2014    Lab Results   Component Value Date    HGBA1C 9.5 (H) 12/15/2018       Home Diabetes Medications: none, previously on Metform (stopped two years ago)    How often checking glucose at home? about once per month   BG readings on regimen: 130s-160s  Hypoglycemia on the regimen?  No  Missed doses on regimen? N/A    Diabetes Complications include: none    Complicating diabetes co morbidities:   History of MI      HPI:   Patient is a 53 y.o. male with a diagnosis of CAD and STEMI who is s/p PCI on 12/14/18.  Patient diagnosed with DM2 approximately 4 years ago, previously controlled on Metformin in combination with diet and exercise.  DM2 previously managed by PCP, Dr. Carlos Crawley.  Endocrine consulted for DM/BG management.

## 2018-12-15 NOTE — ASSESSMENT & PLAN NOTE
-New onset, previously told he was pre-DM  -Now A1c 9.5  -Endocrine and Nutrition consulted  -While inpt, will start detemir 10 + SSI

## 2018-12-15 NOTE — PLAN OF CARE
Problem: Adult Inpatient Plan of Care  Goal: Plan of Care Review  Outcome: Revised  Pt free of falls/traumas/injuries. Denies c/o SOB, CP, or discomfort. Generalized skin remains CDI; no edema noted.  Pt S/P LHC yesterday.  R groin site PAVITHRA, CDI, without redness or swelling.  Pt remains NSR with 1st degree AVB. Blood glucose managed with supplemental insulin.  Education provided on blood glucose management, Accuchecks, and insulin administration.  Plan for possible discharge tomorrow. Pt tolerating plan of care.

## 2018-12-15 NOTE — PROGRESS NOTES
Report received from MOSHE Graham of ICU.  Telemetry notified of transfer.  Awaiting pt arrival    1354  Pt arrived on unit via wheelchair on RA with telemetry on place.  Pt settled comfortably in bed, denying any c/o; VSS.  Pt and family oriented to room and unit.  Urinal provided and pt educated on I/O monitoring.  Bed in lowest position with siderails up x2.  Call bell within reach; pt instructed to call for assistance.  Pt and family tolerating plan of care.

## 2018-12-15 NOTE — SUBJECTIVE & OBJECTIVE
Interval HPI:   Overnight events: Admitted yesterday, underwent PCI for STEMI.  Nausea noted overnight.  BG markedly elevated overnight.  Eatin%  Nausea: Yes  Hypoglycemia and intervention: No  Fever: No  TPN and/or TF: No    PMH, PSH, FH, SH reviewed     Review of Systems    Constitutional: Positive for weight changes, 10 lb gain over past year.  Eyes: Negative for visual disturbance, wear reading glasses.  Respiratory: Positive for cough with position changes.   Cardiovascular: Negative for chest pain.  Gastrointestinal: Positive for nausea/vomitting.  Endocrine: Negative for polyuria.  Positive for polydipsia.  Musculoskeletal: Negative for back pain.  Skin: Negative for rash.  Neurological: Negative for syncope.  Positive for vertigo.  Psychiatric/Behavioral: Negative for depression.    Current Medications and/or Treatments Impacting Glycemic Control  Immunotherapy:    Immunosuppressants     None        Steroids:   Hormones (From admission, onward)    None        Pressors:    Autonomic Drugs (From admission, onward)    None        Hyperglycemia/Diabetes Medications:   Antihyperglycemics (From admission, onward)    Start     Stop Route Frequency Ordered    12/15/18 0900  insulin detemir U-100 pen 10 Units      -- SubQ Daily 12/15/18 0743    12/15/18 0842  insulin aspart U-100 pen 1-10 Units      -- SubQ Before meals & nightly PRN 12/15/18 0743             PHYSICAL EXAMINATION:  Vitals:    12/15/18 0900   BP: 124/81   Pulse: 77   Resp: (!) 24   Temp:      Body mass index is 27.75 kg/m².    Physical Exam     Constitutional: Well developed, well nourished, NAD.  ENT: External ears no masses with nose patent; normal hearing.  Neck: Supple; trachea midline  Cardiovascular: Normal heart sounds, no LE edema. DP +2 bilaterally.  Lungs: Normal effort; lungs anterior bilaterally clear to auscultation.  Abdomen: Soft, no masses, no hernias.  MS: No clubbing or cyanosis of nails noted; unable to assess gait.  Skin: No  rashes, lesions, or ulcers; no nodules. R groin puncture site with gauze drsBipin  Psychiatric: Good judgement and insight; normal mood and affect.  Neurological: Cranial nerves are grossly intact.  Foot: nails in good condition, no amputations noted.

## 2018-12-15 NOTE — ASSESSMENT & PLAN NOTE
BG goal 140-180    Levemir 10 units now  Levemir 20 units daily, first dose tomorrow  Novolog 7 units with meals  Low dose correction scale  BG monitoring AC/HS    Discharge planning:  Will discharge on SQ insulin, dosing recommendations tomorrow after evaluation insulin therapy effectiveness today.  Will provide patient with DM survival teaching.  Patient and spouse very receptive to information and motivated to get DM under control.  Patient will need urgent follow up with [facility] endocrine provider.

## 2018-12-15 NOTE — PROGRESS NOTES
[facility]  Cardiology  Progress Note    Patient Name: Aj Cuellar  MRN: [id number]  Admission Date: 12/14/2018  Hospital Length of Stay: 1 days  Code Status: Full Code   Attending Physician: Fabián Mar MD   Primary Care Physician: Primary Doctor No  Expected Discharge Date:   Principal Problem:ST elevation myocardial infarction (STEMI)    Subjective:     Interval HPI: Chest pain free overnight, feels well this AM. Remains in Wenckebach 2nd degree AVB, rates .     Review of Systems   All other systems reviewed and are negative.    Objective:     Vital Signs (Most Recent):  Temp: 98 °F (36.7 °C) (12/15/18 0800)  Pulse: 77 (12/15/18 0900)  Resp: (!) 24 (12/15/18 0900)  BP: 124/81 (12/15/18 0900)  SpO2: 98 % (12/15/18 0900) Vital Signs (24h Range):  Temp:  [97.6 °F (36.4 °C)-98.1 °F (36.7 °C)] 98 °F (36.7 °C)  Pulse:  [] 77  Resp:  [10-26] 24  SpO2:  [95 %-100 %] 98 %  BP: ()/(53-86) 124/81     Weight: 90.3 kg (199 lb)  Body mass index is 27.75 kg/m².    SpO2: 98 %  O2 Device (Oxygen Therapy): room air      Intake/Output Summary (Last 24 hours) at 12/15/2018 1125  Last data filed at 12/14/2018 2100  Gross per 24 hour   Intake 531 ml   Output 1300 ml   Net -769 ml       Lines/Drains/Airways     Peripheral Intravenous Line                 Peripheral IV - Single Lumen 12/14/18 0855 Right Antecubital 1 day         Peripheral IV - Single Lumen 12/14/18 0859 Right Hand 1 day                Physical Exam   Constitutional: He is oriented to person, place, and time. No distress.   HENT:   Head: Atraumatic.   Mouth/Throat: No oropharyngeal exudate.   Eyes: EOM are normal. No scleral icterus.   Neck: Neck supple. No JVD present.   Cardiovascular: Normal rate, regular rhythm and normal heart sounds. Exam reveals no gallop and no friction rub.   No murmur heard.  Pulmonary/Chest: Effort normal and breath sounds normal. No respiratory distress. He has no wheezes. He has no rales. He  exhibits no tenderness.   Abdominal: Soft. Bowel sounds are normal. He exhibits no distension. There is no tenderness.   Musculoskeletal: He exhibits no edema.   Neurological: He is alert and oriented to person, place, and time. No cranial nerve deficit.   Skin: Skin is warm and dry. No erythema.   Psychiatric: He has a normal mood and affect.       Significant Labs:   BMP:   Recent Labs   Lab 18  0855 12/15/18  0233   * 378*   * 133*   K 4.3 4.6    100   CO2 23 19*   BUN 14 21*   CREATININE 1.2 1.3   CALCIUM 9.1 8.7   MG  --  2.0   , CMP   Recent Labs   Lab 18  0855 12/15/18  0233   * 133*   K 4.3 4.6    100   CO2 23 19*   * 378*   BUN 14 21*   CREATININE 1.2 1.3   CALCIUM 9.1 8.7   PROT 7.4  --    ALBUMIN 4.2  --    BILITOT 0.6  --    ALKPHOS 68  --    AST 15  --    ALT 15  --    ANIONGAP 9 14   ESTGFRAFRICA >60.0 >60.0   EGFRNONAA >60.0 >60.0   , CBC   Recent Labs   Lab 18  0855 12/15/18  0019 12/15/18  0233   WBC 8.85 11.17 13.20*   HGB 12.9* 11.4* 11.4*   HCT 37.6* 32.8* 33.1*    223 255   , INR   Recent Labs   Lab 18  0858   INR 1.0    and Lipid Panel   Recent Labs   Lab 12/15/18  0233   CHOL 202*   HDL 44   LDLCALC 133.4   TRIG 123   CHOLHDL 21.8       Significant Imaging: Echocardiogram: 2D echo with color flow doppler: No results found for this or any previous visit. and EKG: initial EKG NSR, inferior TALIA. Post EKnd degree Mobitz type 1 AVB, normal ST segments    Assessment and Plan:     * ST elevation myocardial infarction (STEMI)    53M previously healthy with family hx of early CAD and HOCM presenting with RCA STEMI s/p successful PCI. C also with LAD  with R->L collaterals from RCA.    -ASA for life  -Brilinta for at least one year  -High intensity statin  -Hold BB given 2nd deg Wenckebach AVB, will monitor for resolution  -ACE/ARB if low EF  -TTE  -If EF down, could consider PET stress/viability for LAD territory  -A1c 9.3,  lipid panel c/w HLD  -Nonsmoker  -Cardiac rehab referral     Filippo second degree AV block    Secondary to ischemia  Will monitor for resolution with successful PCI  Present but improved conduction today       Diabetes mellitus without complication    -New onset, previously told he was pre-DM  -Now A1c 9.5  -Endocrine and Nutrition consulted  -While inpt, will start detemir 10 + SSI     Hyperlipidemia    -Started high intensity statin  -Low cholesterol diet         VTE Risk Mitigation (From admission, onward)        Ordered     heparin (porcine) injection 5,000 Units  Every 8 hours      12/14/18 1535     IP VTE HIGH RISK PATIENT  Once      12/14/18 1535          Cezar Mistry MD  Cardiology  [facility]

## 2018-12-15 NOTE — ASSESSMENT & PLAN NOTE
53M previously healthy with family hx of early CAD and HOCM presenting with RCA STEMI s/p successful PCI. [facility] also with LAD  with R->L collaterals from RCA.    -ASA for life  -Brilinta for at least one year  -High intensity statin  -Hold BB given 2nd deg Filippo AVB, will monitor for resolution  -ACE/ARB if low EF  -TTE  -If EF down, could consider PET stress/viability for LAD territory  -A1c 9.3, lipid panel c/w HLD  -Nonsmoker  -Cardiac rehab referral

## 2018-12-15 NOTE — SUBJECTIVE & OBJECTIVE
Interval HPI: Chest pain free overnight, feels well this AM. Remains in Wenckebach 2nd degree AVB, rates .     Review of Systems   All other systems reviewed and are negative.    Objective:     Vital Signs (Most Recent):  Temp: 98 °F (36.7 °C) (12/15/18 0800)  Pulse: 77 (12/15/18 0900)  Resp: (!) 24 (12/15/18 0900)  BP: 124/81 (12/15/18 0900)  SpO2: 98 % (12/15/18 0900) Vital Signs (24h Range):  Temp:  [97.6 °F (36.4 °C)-98.1 °F (36.7 °C)] 98 °F (36.7 °C)  Pulse:  [] 77  Resp:  [10-26] 24  SpO2:  [95 %-100 %] 98 %  BP: ()/(53-86) 124/81     Weight: 90.3 kg (199 lb)  Body mass index is 27.75 kg/m².    SpO2: 98 %  O2 Device (Oxygen Therapy): room air      Intake/Output Summary (Last 24 hours) at 12/15/2018 1125  Last data filed at 12/14/2018 2100  Gross per 24 hour   Intake 531 ml   Output 1300 ml   Net -769 ml       Lines/Drains/Airways     Peripheral Intravenous Line                 Peripheral IV - Single Lumen 12/14/18 0855 Right Antecubital 1 day         Peripheral IV - Single Lumen 12/14/18 0859 Right Hand 1 day                Physical Exam   Constitutional: He is oriented to person, place, and time. No distress.   HENT:   Head: Atraumatic.   Mouth/Throat: No oropharyngeal exudate.   Eyes: EOM are normal. No scleral icterus.   Neck: Neck supple. No JVD present.   Cardiovascular: Normal rate, regular rhythm and normal heart sounds. Exam reveals no gallop and no friction rub.   No murmur heard.  Pulmonary/Chest: Effort normal and breath sounds normal. No respiratory distress. He has no wheezes. He has no rales. He exhibits no tenderness.   Abdominal: Soft. Bowel sounds are normal. He exhibits no distension. There is no tenderness.   Musculoskeletal: He exhibits no edema.   Neurological: He is alert and oriented to person, place, and time. No cranial nerve deficit.   Skin: Skin is warm and dry. No erythema.   Psychiatric: He has a normal mood and affect.       Significant Labs:   BMP:   Recent Labs    Lab 18  0855 12/15/18  0233   * 378*   * 133*   K 4.3 4.6    100   CO2 23 19*   BUN 14 21*   CREATININE 1.2 1.3   CALCIUM 9.1 8.7   MG  --  2.0   , CMP   Recent Labs   Lab 18  0855 12/15/18  0233   * 133*   K 4.3 4.6    100   CO2 23 19*   * 378*   BUN 14 21*   CREATININE 1.2 1.3   CALCIUM 9.1 8.7   PROT 7.4  --    ALBUMIN 4.2  --    BILITOT 0.6  --    ALKPHOS 68  --    AST 15  --    ALT 15  --    ANIONGAP 9 14   ESTGFRAFRICA >60.0 >60.0   EGFRNONAA >60.0 >60.0   , CBC   Recent Labs   Lab 18  0855 12/15/18  0019 12/15/18  0233   WBC 8.85 11.17 13.20*   HGB 12.9* 11.4* 11.4*   HCT 37.6* 32.8* 33.1*    223 255   , INR   Recent Labs   Lab 18  0858   INR 1.0    and Lipid Panel   Recent Labs   Lab 12/15/18  0233   CHOL 202*   HDL 44   LDLCALC 133.4   TRIG 123   CHOLHDL 21.8       Significant Imaging: Echocardiogram: 2D echo with color flow doppler: No results found for this or any previous visit. and EKG: initial EKG NSR, inferior TALIA. Post EKnd degree Mobitz type 1 AVB, normal ST segments

## 2018-12-15 NOTE — CONSULTS
Nutrition-Related Diabetes Education      Time Spent:  25 minutes    Learners: Patient, family, friends    Current HbA1c:  9.5%    Home diabetes medication(s): newly diagnosed, no home meds yet    Nutrition Education with handouts: My Plate Method, Carb Counting, low sodium diet    Comments:  Pt newly diagnosed with diabetes. Unsure if pt is going home on insulin, provided education for oral and insulin. Provided information on carbohydrates, portion control using   MyPlate Method, high fiber diet, low saturated fat and low salt diet. Discussed options for exercise regimen once pt is cleared by doctor. Pt is very motivated to make changes.       Follow up:  12/22/18    Please consult as needed.  Olamide Rod RD    Thank you!

## 2018-12-15 NOTE — PLAN OF CARE
Problem: Adult Inpatient Plan of Care  Goal: Plan of Care Review  Outcome: Ongoing (interventions implemented as appropriate)  Patient VSS, free from acute events this shift. Patient right fem groin site clean, dry and intact. No signs of bleeding/hematoma. +2 distal pulses. AAOx4, plan for stepdown/discharge today. No C/O chestpain or SOB. Administered PRN tylenol dose for pain. Administered PRN zofran and promethazine for nausea. Plan of care discussed with patient and patients wife @ bedside, all questions answered. See flowsheets for details, WCTM

## 2018-12-16 LAB
ANION GAP SERPL CALC-SCNC: 10 MMOL/L
BASOPHILS # BLD AUTO: 0.01 K/UL
BASOPHILS NFR BLD: 0.1 %
BUN SERPL-MCNC: 18 MG/DL
CALCIUM SERPL-MCNC: 9 MG/DL
CHLORIDE SERPL-SCNC: 100 MMOL/L
CO2 SERPL-SCNC: 24 MMOL/L
CREAT SERPL-MCNC: 0.9 MG/DL
DIFFERENTIAL METHOD: ABNORMAL
EOSINOPHIL # BLD AUTO: 0 K/UL
EOSINOPHIL NFR BLD: 0 %
ERYTHROCYTE [DISTWIDTH] IN BLOOD BY AUTOMATED COUNT: 13.8 %
EST. GFR  (AFRICAN AMERICAN): >60 ML/MIN/1.73 M^2
EST. GFR  (NON AFRICAN AMERICAN): >60 ML/MIN/1.73 M^2
GLUCOSE SERPL-MCNC: 196 MG/DL
HCT VFR BLD AUTO: 33.5 %
HGB BLD-MCNC: 11.5 G/DL
IMM GRANULOCYTES # BLD AUTO: 0.06 K/UL
IMM GRANULOCYTES NFR BLD AUTO: 0.5 %
LYMPHOCYTES # BLD AUTO: 1.4 K/UL
LYMPHOCYTES NFR BLD: 12.9 %
MCH RBC QN AUTO: 31.5 PG
MCHC RBC AUTO-ENTMCNC: 34.3 G/DL
MCV RBC AUTO: 92 FL
MONOCYTES # BLD AUTO: 0.6 K/UL
MONOCYTES NFR BLD: 5.7 %
NEUTROPHILS # BLD AUTO: 9 K/UL
NEUTROPHILS NFR BLD: 80.8 %
NRBC BLD-RTO: 0 /100 WBC
PLATELET # BLD AUTO: 193 K/UL
PMV BLD AUTO: 9.7 FL
POCT GLUCOSE: 178 MG/DL (ref 70–110)
POCT GLUCOSE: 208 MG/DL (ref 70–110)
POCT GLUCOSE: 224 MG/DL (ref 70–110)
POCT GLUCOSE: 228 MG/DL (ref 70–110)
POTASSIUM SERPL-SCNC: 4.5 MMOL/L
RBC # BLD AUTO: 3.65 M/UL
SODIUM SERPL-SCNC: 134 MMOL/L
WBC # BLD AUTO: 11.09 K/UL

## 2018-12-16 PROCEDURE — 93005 ELECTROCARDIOGRAM TRACING: CPT

## 2018-12-16 PROCEDURE — 63600175 PHARM REV CODE 636 W HCPCS: Performed by: STUDENT IN AN ORGANIZED HEALTH CARE EDUCATION/TRAINING PROGRAM

## 2018-12-16 PROCEDURE — 93010 ELECTROCARDIOGRAM REPORT: CPT | Mod: ,,, | Performed by: INTERNAL MEDICINE

## 2018-12-16 PROCEDURE — 99233 SBSQ HOSP IP/OBS HIGH 50: CPT | Mod: ,,, | Performed by: INTERNAL MEDICINE

## 2018-12-16 PROCEDURE — 99232 SBSQ HOSP IP/OBS MODERATE 35: CPT | Mod: ,,, | Performed by: NURSE PRACTITIONER

## 2018-12-16 PROCEDURE — 36415 COLL VENOUS BLD VENIPUNCTURE: CPT

## 2018-12-16 PROCEDURE — 20600001 HC STEP DOWN PRIVATE ROOM

## 2018-12-16 PROCEDURE — 80048 BASIC METABOLIC PNL TOTAL CA: CPT

## 2018-12-16 PROCEDURE — 93010 ELECTROCARDIOGRAM REPORT: CPT | Mod: 76,,, | Performed by: INTERNAL MEDICINE

## 2018-12-16 PROCEDURE — 25000003 PHARM REV CODE 250: Performed by: STUDENT IN AN ORGANIZED HEALTH CARE EDUCATION/TRAINING PROGRAM

## 2018-12-16 PROCEDURE — 63600175 PHARM REV CODE 636 W HCPCS: Performed by: NURSE PRACTITIONER

## 2018-12-16 PROCEDURE — S5571 INSULIN DISPOS PEN 3 ML: HCPCS | Performed by: NURSE PRACTITIONER

## 2018-12-16 PROCEDURE — 85025 COMPLETE CBC W/AUTO DIFF WBC: CPT

## 2018-12-16 RX ORDER — INSULIN ASPART 100 [IU]/ML
10 INJECTION, SOLUTION INTRAVENOUS; SUBCUTANEOUS
Status: DISCONTINUED | OUTPATIENT
Start: 2018-12-16 | End: 2018-12-17

## 2018-12-16 RX ORDER — MIRTAZAPINE 7.5 MG/1
15 TABLET, FILM COATED ORAL NIGHTLY
Status: DISCONTINUED | OUTPATIENT
Start: 2018-12-16 | End: 2018-12-17 | Stop reason: HOSPADM

## 2018-12-16 RX ORDER — INSULIN ASPART 100 [IU]/ML
8 INJECTION, SOLUTION INTRAVENOUS; SUBCUTANEOUS
Status: DISCONTINUED | OUTPATIENT
Start: 2018-12-16 | End: 2018-12-16

## 2018-12-16 RX ADMIN — TICAGRELOR 90 MG: 90 TABLET ORAL at 09:12

## 2018-12-16 RX ADMIN — HEPARIN SODIUM 5000 UNITS: 5000 INJECTION, SOLUTION INTRAVENOUS; SUBCUTANEOUS at 09:12

## 2018-12-16 RX ADMIN — INSULIN ASPART 2 UNITS: 100 INJECTION, SOLUTION INTRAVENOUS; SUBCUTANEOUS at 12:12

## 2018-12-16 RX ADMIN — TICAGRELOR 90 MG: 90 TABLET ORAL at 08:12

## 2018-12-16 RX ADMIN — INSULIN ASPART 8 UNITS: 100 INJECTION, SOLUTION INTRAVENOUS; SUBCUTANEOUS at 12:12

## 2018-12-16 RX ADMIN — INSULIN ASPART 2 UNITS: 100 INJECTION, SOLUTION INTRAVENOUS; SUBCUTANEOUS at 08:12

## 2018-12-16 RX ADMIN — INSULIN ASPART 8 UNITS: 100 INJECTION, SOLUTION INTRAVENOUS; SUBCUTANEOUS at 08:12

## 2018-12-16 RX ADMIN — HEPARIN SODIUM 5000 UNITS: 5000 INJECTION, SOLUTION INTRAVENOUS; SUBCUTANEOUS at 05:12

## 2018-12-16 RX ADMIN — ATORVASTATIN CALCIUM 80 MG: 20 TABLET, FILM COATED ORAL at 08:12

## 2018-12-16 RX ADMIN — HEPARIN SODIUM 5000 UNITS: 5000 INJECTION, SOLUTION INTRAVENOUS; SUBCUTANEOUS at 02:12

## 2018-12-16 RX ADMIN — INSULIN ASPART 2 UNITS: 100 INJECTION, SOLUTION INTRAVENOUS; SUBCUTANEOUS at 05:12

## 2018-12-16 RX ADMIN — INSULIN ASPART 10 UNITS: 100 INJECTION, SOLUTION INTRAVENOUS; SUBCUTANEOUS at 05:12

## 2018-12-16 RX ADMIN — INSULIN DETEMIR 20 UNITS: 100 INJECTION, SOLUTION SUBCUTANEOUS at 08:12

## 2018-12-16 RX ADMIN — MIRTAZAPINE 15 MG: 7.5 TABLET ORAL at 09:12

## 2018-12-16 RX ADMIN — ASPIRIN 81 MG CHEWABLE TABLET 81 MG: 81 TABLET CHEWABLE at 08:12

## 2018-12-16 NOTE — PLAN OF CARE
Problem: Adult Inpatient Plan of Care  Goal: Plan of Care Review  Outcome: Revised  Pt free of falls/traumas/injuries. Denies c/o SOB, CP, or discomfort. Generalized skin remains CDI; no edema noted.  Pt S/P [facility] Friday.  R groin site PAVITHRA, CDI, without redness or swelling.  Pt NSR with 2nd degree AVB this shift; EKG completed per MD orders. Blood glucose managed with supplemental insulin.  Education provided on blood glucose management, Accuchecks, and insulin administration.  Plan for TTE.  Wife at the bedside.  Pt and family tolerating plan of care.

## 2018-12-16 NOTE — ASSESSMENT & PLAN NOTE
-New onset, previously told he was pre-DM  -Now A1c 9.5  -Endocrine and Nutrition consulted: levemir 20U with aspart 8U TIDWM + LDSII

## 2018-12-16 NOTE — HOSPITAL COURSE
The day after admission, patient's EKG showed 2nd degree Mobitz type 1 AVB with normal ST segments. It was thought that this was secondary to ischemia and monitored for resolution. 2D ECHO done on admission day showed EF 40% with Grade I diastolic dysfunction with local segmental wall abnormalities. The next day, repeat EKG showed persistent 2nd degree Mobitz type 1 AVB. 2D ECHO was ordered to assess for change in cardiac function. Patient denied any chest pain, SOB, nausea, or palpitations since day of admission.

## 2018-12-16 NOTE — ASSESSMENT & PLAN NOTE
53M previously healthy with family hx of early CAD and HOCM presenting with RCA STEMI s/p successful PCI. [facility] also with LAD  with R->L collaterals from RCA. 2D ECHO showed EF 40% with Grade I diastolic dysfunction and local segmental wall motion abnormalities.     Plan:  -ASA for life  -Brilinta for at least one year  -High intensity statin  -Hold BB given 2nd deg Wenckebach AVB, will monitor for resolution  -ACE/ARB if low EF  -repeat TTE ordered to assess change in cardiac function  -If EF down, could consider PET stress/viability for LAD territory  -A1c 9.3, lipid panel c/w HLD  -Nonsmoker  -Cardiac rehab referral

## 2018-12-16 NOTE — PROGRESS NOTES
EKG completed per MD orders.  EKG reading ST with 2nd degree AVB with acute MI/STEMI.  Pt S/P LHC with PCI two days ago.  Pt sitting up comfortably in chair denying any c/o. MD with critical care team notified; stated she is with Dr. Mar now and that they will pull up EKG results to discuss.  No new orders at this time. Will continue to monitor.

## 2018-12-16 NOTE — SUBJECTIVE & OBJECTIVE
"Interval HPI:   Overnight events: Stepped down from CMICU to CSU yesterday afternoon, NAEON.  BG markedly elevated yesterday but trended down, slightly above goal this am on labs (196).  Possible discharge today per primary team.  Eatin%  Nausea: No  Hypoglycemia and intervention: No  Fever: No  TPN and/or TF: No    /74 (BP Location: Left arm, Patient Position: Lying)   Pulse 94   Temp 98.7 °F (37.1 °C) (Oral)   Resp 20   Ht 5' 11" (1.803 m)   Wt 89.1 kg (196 lb 6.9 oz)   SpO2 96%   BMI 27.40 kg/m²     Labs Reviewed and Include    Recent Labs   Lab 18  0345   *   CALCIUM 9.0   *   K 4.5   CO2 24      BUN 18   CREATININE 0.9     Lab Results   Component Value Date    WBC 11.09 2018    HGB 11.5 (L) 2018    HCT 33.5 (L) 2018    MCV 92 2018     2018     No results for input(s): TSH, FREET4 in the last 168 hours.  Lab Results   Component Value Date    HGBA1C 9.5 (H) 12/15/2018       Nutritional status:   Body mass index is 27.4 kg/m².  Lab Results   Component Value Date    ALBUMIN 4.2 2018    ALBUMIN 3.4 (L) 2017    ALBUMIN 4.1 2010     No results found for: PREALBUMIN    Estimated Creatinine Clearance: 99.9 mL/min (based on SCr of 0.9 mg/dL).    Accu-Checks  Recent Labs     12/15/18  0806 12/15/18  1049 12/15/18  1648 12/15/18  202   POCTGLUCOSE 339* 328* 270* 244*       Current Medications and/or Treatments Impacting Glycemic Control  Immunotherapy:    Immunosuppressants     None        Steroids:   Hormones (From admission, onward)    None        Pressors:    Autonomic Drugs (From admission, onward)    None        Hyperglycemia/Diabetes Medications:   Antihyperglycemics (From admission, onward)    Start     Stop Route Frequency Ordered    18 0900  insulin detemir U-100 pen 20 Units      -- SubQ Daily 12/15/18 1253    12/15/18 1645  insulin aspart U-100 pen 7 Units      -- SubQ 3 times daily with meals 12/15/18 " 125    12/15/18 1359  insulin aspart U-100 pen 0-5 Units      -- SubQ Before meals & nightly PRN 12/15/18 5225

## 2018-12-16 NOTE — PROGRESS NOTES
[facility]  Cardiology  Progress Note    Patient Name: Aj Cuellar  MRN: [id number]  Admission Date: 12/14/2018  Hospital Length of Stay: 2 days  Code Status: Full Code   Attending Physician: Fabián Mar MD   Primary Care Physician: Primary Doctor No  Expected Discharge Date:   Principal Problem:ST elevation myocardial infarction (STEMI)    Subjective:     Hospital Course:   The day after admission, patient's EKG showed 2nd degree Mobitz type 1 AVB with normal ST segments. It was thought that this was secondary to ischemia and monitored for resolution. 2D ECHO done on admission day showed EF 40% with Grade I diastolic dysfunction with local segmental wall abnormalities. The next day, repeat EKG showed persistent 2nd degree Mobitz type 1 AVB. 2D ECHO was ordered to assess for change in cardiac function. Patient denied any chest pain, SOB, nausea, or palpitations since day of admission.        Interval History: Patient denies chest pain, SOB, palpitations, and nausea and feels well this AM. He endorses fatigue upon exertion. EKG early this AM shows patient is still in second degree AV block (Mobitz type I).     Review of Systems   Constitution: Negative for chills and fever.   Cardiovascular: Negative for chest pain and leg swelling.   Respiratory: Negative for cough and shortness of breath.    Skin: Negative for color change and rash.   Gastrointestinal: Negative for abdominal pain, nausea and vomiting.   Neurological: Negative for dizziness and headaches.   Psychiatric/Behavioral: Negative for altered mental status. The patient is not nervous/anxious.      Objective:     Vital Signs (Most Recent):  Temp: 99.3 °F (37.4 °C) (12/16/18 1223)  Pulse: 106 (12/16/18 1223)  Resp: 18 (12/16/18 1223)  BP: 119/69 (12/16/18 1223)  SpO2: 95 % (12/16/18 1223) Vital Signs (24h Range):  Temp:  [98 °F (36.7 °C)-99.3 °F (37.4 °C)] 99.3 °F (37.4 °C)  Pulse:  [] 106  Resp:  [16-20] 18  SpO2:  [92 %-96  %] 95 %  BP: (101-139)/(65-80) 119/69     Weight: 89.1 kg (196 lb 6.9 oz)  Body mass index is 27.4 kg/m².     SpO2: 95 %  O2 Device (Oxygen Therapy): room air      Intake/Output Summary (Last 24 hours) at 12/16/2018 1321  Last data filed at 12/16/2018 1100  Gross per 24 hour   Intake 1590 ml   Output 2225 ml   Net -635 ml       Lines/Drains/Airways     Peripheral Intravenous Line                 Peripheral IV - Single Lumen 12/14/18 0855 Right Antecubital 2 days         Peripheral IV - Single Lumen 12/14/18 0859 Right Hand 2 days                Physical Exam   Constitutional: He is oriented to person, place, and time. No distress.   HENT:   Head: Atraumatic.   Neck: Neck supple. No JVD present.   Cardiovascular: Normal rate, regular rhythm and normal heart sounds. Exam reveals no gallop and no friction rub.   No murmur heard.  Pulmonary/Chest: Effort normal and breath sounds normal. No respiratory distress. He has no wheezes. He has no rales. He exhibits no tenderness.   Abdominal: Soft. Bowel sounds are normal. He exhibits no distension. There is no tenderness.   Musculoskeletal: He exhibits no edema.   Neurological: He is alert and oriented to person, place, and time.   Skin: Skin is warm and dry. No erythema.   Psychiatric: He has a normal mood and affect.   Nursing note and vitals reviewed.    Significant Labs:   CMP   Recent Labs   Lab 12/15/18  0233 12/16/18  0345   * 134*   K 4.6 4.5    100   CO2 19* 24   * 196*   BUN 21* 18   CREATININE 1.3 0.9   CALCIUM 8.7 9.0   ANIONGAP 14 10   ESTGFRAFRICA >60.0 >60.0   EGFRNONAA >60.0 >60.0    and CBC   Recent Labs   Lab 12/15/18  0019 12/15/18  0233 12/16/18  0345   WBC 11.17 13.20* 11.09   HGB 11.4* 11.4* 11.5*   HCT 32.8* 33.1* 33.5*    255 193       Significant Imaging:   EKG (12/16/18 5AM): 2nd degree AV block Mobitz 1  Repeat TTE ordered    Assessment and Plan:     Brief HPI: 53M presenting with STEMI s/p LHC successful PCl, newly  diagnosed DMII; initiated GDMT with endocrine assistance in managing DMII    * ST elevation myocardial infarction (STEMI)    53M previously healthy with family hx of early CAD and HOCM presenting with RCA STEMI s/p successful PCI. [facility] also with LAD  with R->L collaterals from RCA. 2D ECHO showed EF 40% with Grade I diastolic dysfunction and local segmental wall motion abnormalities.     Plan:  -ASA for life  -Brilinta for at least one year  -High intensity statin  -Hold BB given 2nd deg Wenckebach AVB, will monitor for resolution  -ACE/ARB if low EF  -repeat TTE ordered to assess change in cardiac function  -If EF down, could consider PET stress/viability for LAD territory  -A1c 9.3, lipid panel c/w HLD  -Nonsmoker  -Cardiac rehab referral     Hyperlipidemia    -Started high intensity statin  -Low cholesterol diet     Diabetes mellitus without complication    -New onset, previously told he was pre-DM  -Now A1c 9.5  -Endocrine and Nutrition consulted: levemir 20U with aspart 8U TIDWM + LDSII       Wenckebach second degree AV block    Secondary to ischemia  Will monitor for resolution with successful PCI  Still present but improved conduction          VTE Risk Mitigation (From admission, onward)        Ordered     heparin (porcine) injection 5,000 Units  Every 8 hours      12/14/18 1535     IP VTE HIGH RISK PATIENT  Once      12/14/18 1535          Devorah Raymundo MD PGY1  Cardiology  [facility]

## 2018-12-16 NOTE — PROGRESS NOTES
"[facility]  Endocrinology  Progress Note    Admit Date: 2018     Reason for Consult: Management of T2DM, Hyperglycemia     Surgical Procedure and Date: PCI for STEMI 18    Diabetes diagnosis year:     Lab Results   Component Value Date    HGBA1C 9.5 (H) 12/15/2018       Home Diabetes Medications: none, previously on Metform (stopped two years ago)    How often checking glucose at home? about once per month   BG readings on regimen: 130s-160s  Hypoglycemia on the regimen?  No  Missed doses on regimen? N/A    Diabetes Complications include: none    Complicating diabetes co morbidities:   History of MI      HPI:   Patient is a 53 y.o. male with a diagnosis of CAD and STEMI who is s/p PCI on 18.  Patient diagnosed with DM2 approximately 4 years ago, previously controlled on Metformin in combination with diet and exercise.  DM2 previously managed by PCP, Dr. Carlos Crawley.  Endocrine consulted for DM/BG management.            Interval HPI:   Overnight events: Stepped down from CMICU to CSU yesterday afternoon, NAEON.  BG markedly elevated yesterday but trended down, slightly above goal this am on labs (196).  Possible discharge today per primary team.  Eatin%  Nausea: No  Hypoglycemia and intervention: No  Fever: No  TPN and/or TF: No    /74 (BP Location: Left arm, Patient Position: Lying)   Pulse 94   Temp 98.7 °F (37.1 °C) (Oral)   Resp 20   Ht 5' 11" (1.803 m)   Wt 89.1 kg (196 lb 6.9 oz)   SpO2 96%   BMI 27.40 kg/m²      Labs Reviewed and Include    Recent Labs   Lab 18  0345   *   CALCIUM 9.0   *   K 4.5   CO2 24      BUN 18   CREATININE 0.9     Lab Results   Component Value Date    WBC 11.09 2018    HGB 11.5 (L) 2018    HCT 33.5 (L) 2018    MCV 92 2018     2018     No results for input(s): TSH, FREET4 in the last 168 hours.  Lab Results   Component Value Date    HGBA1C 9.5 (H) 12/15/2018 "       Nutritional status:   Body mass index is 27.4 kg/m².  Lab Results   Component Value Date    ALBUMIN 4.2 12/14/2018    ALBUMIN 3.4 (L) 05/29/2017    ALBUMIN 4.1 04/30/2010     No results found for: PREALBUMIN    Estimated Creatinine Clearance: 99.9 mL/min (based on SCr of 0.9 mg/dL).    Accu-Checks  Recent Labs     12/15/18  0806 12/15/18  1049 12/15/18  1648 12/15/18  2025   POCTGLUCOSE 339* 328* 270* 244*       Current Medications and/or Treatments Impacting Glycemic Control  Immunotherapy:    Immunosuppressants     None        Steroids:   Hormones (From admission, onward)    None        Pressors:    Autonomic Drugs (From admission, onward)    None        Hyperglycemia/Diabetes Medications:   Antihyperglycemics (From admission, onward)    Start     Stop Route Frequency Ordered    12/16/18 0900  insulin detemir U-100 pen 20 Units      -- SubQ Daily 12/15/18 1253    12/15/18 1645  insulin aspart U-100 pen 7 Units      -- SubQ 3 times daily with meals 12/15/18 1253    12/15/18 1352  insulin aspart U-100 pen 0-5 Units      -- SubQ Before meals & nightly PRN 12/15/18 1253          ASSESSMENT and PLAN    * ST elevation myocardial infarction (STEMI)    S/p PCI 12/14/18  Managed per primary team  Avoid hypoglycemia         Diabetes mellitus without complication    BG goal 140-180    Levemir 20 units daily  Increase Novolog to 8 units with meals  Low dose correction scale  BG monitoring AC/HS    ADDENDUM: Increase Novolog to 10 units with meals    Discharge planning:  Spent approximately 25 minutes at bedside and reviewed topics related to DM including: the need for insulin, how insulin works, what makes it a high risk medication, the importance of immediate follow up with either PCP or endocrine provider, importance of and how to check BG, how to record BG on logs, how to administer insulin, appropriate insulin administration sites, importance of rotating injection sites, hyper/hypoglycemia, how and when to treat  hypoglycemia, when to hold insulin, how the correction scale works, importance of storing unused insulin in the refrigerator, and when to seek medical attention.  Patient verbalized understanding, answered all questions to patient's satisfaction.  Patient will need prescriptions for Levemir, Novolog, pen needles, and insurance preferred BG meter/testing supplies.  If patient discharges today or tomorrow morning, recommend Levemir 20 units SQ daily and Novolog 10 units with meals plus correction scale.  [facility] to [facility].       Overweight (BMI 25.0-29.9)    may contribute to insulin resistance  Body mass index is 27.4 kg/m².           Ari Sharpe, STEFAN  Endocrinology  [facility]

## 2018-12-16 NOTE — SUBJECTIVE & OBJECTIVE
Interval History: Patient denies chest pain, SOB, palpitations, and nausea and feels well this AM. He endorses fatigue upon exertion. EKG early this AM shows patient is still in second degree AV block (Mobitz type I).     Review of Systems   Constitution: Negative for chills and fever.   Cardiovascular: Negative for chest pain and leg swelling.   Respiratory: Negative for cough and shortness of breath.    Skin: Negative for color change and rash.   Gastrointestinal: Negative for abdominal pain, nausea and vomiting.   Neurological: Negative for dizziness and headaches.   Psychiatric/Behavioral: Negative for altered mental status. The patient is not nervous/anxious.      Objective:     Vital Signs (Most Recent):  Temp: 99.3 °F (37.4 °C) (12/16/18 1223)  Pulse: 106 (12/16/18 1223)  Resp: 18 (12/16/18 1223)  BP: 119/69 (12/16/18 1223)  SpO2: 95 % (12/16/18 1223) Vital Signs (24h Range):  Temp:  [98 °F (36.7 °C)-99.3 °F (37.4 °C)] 99.3 °F (37.4 °C)  Pulse:  [] 106  Resp:  [16-20] 18  SpO2:  [92 %-96 %] 95 %  BP: (101-139)/(65-80) 119/69     Weight: 89.1 kg (196 lb 6.9 oz)  Body mass index is 27.4 kg/m².     SpO2: 95 %  O2 Device (Oxygen Therapy): room air      Intake/Output Summary (Last 24 hours) at 12/16/2018 1321  Last data filed at 12/16/2018 1100  Gross per 24 hour   Intake 1590 ml   Output 2225 ml   Net -635 ml       Lines/Drains/Airways     Peripheral Intravenous Line                 Peripheral IV - Single Lumen 12/14/18 0855 Right Antecubital 2 days         Peripheral IV - Single Lumen 12/14/18 0859 Right Hand 2 days                Physical Exam   Constitutional: He is oriented to person, place, and time. No distress.   HENT:   Head: Atraumatic.   Neck: Neck supple. No JVD present.   Cardiovascular: Normal rate, regular rhythm and normal heart sounds. Exam reveals no gallop and no friction rub.   No murmur heard.  Pulmonary/Chest: Effort normal and breath sounds normal. No respiratory distress. He has no  wheezes. He has no rales. He exhibits no tenderness.   Abdominal: Soft. Bowel sounds are normal. He exhibits no distension. There is no tenderness.   Musculoskeletal: He exhibits no edema.   Neurological: He is alert and oriented to person, place, and time.   Skin: Skin is warm and dry. No erythema.   Psychiatric: He has a normal mood and affect.   Nursing note and vitals reviewed.    Significant Labs:   CMP   Recent Labs   Lab 12/15/18  0233 12/16/18  0345   * 134*   K 4.6 4.5    100   CO2 19* 24   * 196*   BUN 21* 18   CREATININE 1.3 0.9   CALCIUM 8.7 9.0   ANIONGAP 14 10   ESTGFRAFRICA >60.0 >60.0   EGFRNONAA >60.0 >60.0    and CBC   Recent Labs   Lab 12/15/18  0019 12/15/18  0233 12/16/18  0345   WBC 11.17 13.20* 11.09   HGB 11.4* 11.4* 11.5*   HCT 32.8* 33.1* 33.5*    255 193       Significant Imaging:   EKG (12/16/18 5AM): 2nd degree AV block Mobitz 1  Repeat TTE ordered

## 2018-12-16 NOTE — ASSESSMENT & PLAN NOTE
Secondary to ischemia  Will monitor for resolution with successful PCI  Still present but improved conduction

## 2018-12-16 NOTE — ASSESSMENT & PLAN NOTE
BG goal 140-180    Levemir 20 units daily  Increase Novolog to 8 units with meals  Low dose correction scale  BG monitoring AC/HS    ADDENDUM: Increase Novolog to 10 units with meals    Discharge planning:  Spent approximately 25 minutes at bedside and reviewed topics related to DM including: the need for insulin, how insulin works, what makes it a high risk medication, the importance of immediate follow up with either PCP or endocrine provider, importance of and how to check BG, how to record BG on logs, how to administer insulin, appropriate insulin administration sites, importance of rotating injection sites, hyper/hypoglycemia, how and when to treat hypoglycemia, when to hold insulin, how the correction scale works, importance of storing unused insulin in the refrigerator, and when to seek medical attention.  Patient verbalized understanding, answered all questions to patient's satisfaction.  Patient will need prescriptions for Levemir, Novolog, pen needles, and insurance preferred BG meter/testing supplies.  If patient discharges today or tomorrow morning, recommend Levemir 20 units SQ daily and Novolog 10 units with meals plus correction scale.  Patient to follow up with [facility].

## 2018-12-17 VITALS
OXYGEN SATURATION: 95 % | HEIGHT: 71 IN | RESPIRATION RATE: 18 BRPM | DIASTOLIC BLOOD PRESSURE: 75 MMHG | SYSTOLIC BLOOD PRESSURE: 114 MMHG | WEIGHT: 196 LBS | BODY MASS INDEX: 27.44 KG/M2 | TEMPERATURE: 98 F | HEART RATE: 92 BPM

## 2018-12-17 LAB
ANION GAP SERPL CALC-SCNC: 10 MMOL/L
BASOPHILS # BLD AUTO: 0.03 K/UL
BASOPHILS NFR BLD: 0.3 %
BSA FOR ECHO PROCEDURE: 2.11 M2
BUN SERPL-MCNC: 17 MG/DL
CALCIUM SERPL-MCNC: 8.6 MG/DL
CHLORIDE SERPL-SCNC: 99 MMOL/L
CO2 SERPL-SCNC: 25 MMOL/L
CREAT SERPL-MCNC: 1 MG/DL
DIFFERENTIAL METHOD: ABNORMAL
EOSINOPHIL # BLD AUTO: 0 K/UL
EOSINOPHIL NFR BLD: 0.3 %
ERYTHROCYTE [DISTWIDTH] IN BLOOD BY AUTOMATED COUNT: 14 %
EST. GFR  (AFRICAN AMERICAN): >60 ML/MIN/1.73 M^2
EST. GFR  (NON AFRICAN AMERICAN): >60 ML/MIN/1.73 M^2
GLUCOSE SERPL-MCNC: 141 MG/DL
HCT VFR BLD AUTO: 35 %
HGB BLD-MCNC: 12 G/DL
IMM GRANULOCYTES # BLD AUTO: 0.05 K/UL
IMM GRANULOCYTES NFR BLD AUTO: 0.5 %
LYMPHOCYTES # BLD AUTO: 1.8 K/UL
LYMPHOCYTES NFR BLD: 17.4 %
MCH RBC QN AUTO: 31.9 PG
MCHC RBC AUTO-ENTMCNC: 34.3 G/DL
MCV RBC AUTO: 93 FL
MONOCYTES # BLD AUTO: 0.7 K/UL
MONOCYTES NFR BLD: 6.4 %
NEUTROPHILS # BLD AUTO: 7.9 K/UL
NEUTROPHILS NFR BLD: 75.1 %
NRBC BLD-RTO: 0 /100 WBC
PLATELET # BLD AUTO: 159 K/UL
PMV BLD AUTO: 9.6 FL
POCT GLUCOSE: 150 MG/DL (ref 70–110)
POCT GLUCOSE: 270 MG/DL (ref 70–110)
POTASSIUM SERPL-SCNC: 3.9 MMOL/L
RBC # BLD AUTO: 3.76 M/UL
SODIUM SERPL-SCNC: 134 MMOL/L
WBC # BLD AUTO: 10.54 K/UL

## 2018-12-17 PROCEDURE — 63600175 PHARM REV CODE 636 W HCPCS: Performed by: INTERNAL MEDICINE

## 2018-12-17 PROCEDURE — 25000003 PHARM REV CODE 250: Performed by: STUDENT IN AN ORGANIZED HEALTH CARE EDUCATION/TRAINING PROGRAM

## 2018-12-17 PROCEDURE — 85025 COMPLETE CBC W/AUTO DIFF WBC: CPT

## 2018-12-17 PROCEDURE — 63600175 PHARM REV CODE 636 W HCPCS: Performed by: STUDENT IN AN ORGANIZED HEALTH CARE EDUCATION/TRAINING PROGRAM

## 2018-12-17 PROCEDURE — 93010 ELECTROCARDIOGRAM REPORT: CPT | Mod: 59,,, | Performed by: INTERNAL MEDICINE

## 2018-12-17 PROCEDURE — S5571 INSULIN DISPOS PEN 3 ML: HCPCS | Performed by: NURSE PRACTITIONER

## 2018-12-17 PROCEDURE — 93005 ELECTROCARDIOGRAM TRACING: CPT

## 2018-12-17 PROCEDURE — 80048 BASIC METABOLIC PNL TOTAL CA: CPT

## 2018-12-17 PROCEDURE — 63600175 PHARM REV CODE 636 W HCPCS: Performed by: NURSE PRACTITIONER

## 2018-12-17 PROCEDURE — 99239 HOSP IP/OBS DSCHRG MGMT >30: CPT | Mod: ,,, | Performed by: INTERNAL MEDICINE

## 2018-12-17 PROCEDURE — 99232 SBSQ HOSP IP/OBS MODERATE 35: CPT | Mod: ,,, | Performed by: NURSE PRACTITIONER

## 2018-12-17 PROCEDURE — 36415 COLL VENOUS BLD VENIPUNCTURE: CPT

## 2018-12-17 RX ORDER — LANCETS
1 EACH MISCELLANEOUS 2 TIMES DAILY WITH MEALS
Qty: 100 EACH | Refills: 11 | Status: CANCELLED | OUTPATIENT
Start: 2018-12-17

## 2018-12-17 RX ORDER — INSULIN PUMP SYRINGE, 3 ML
EACH MISCELLANEOUS
Qty: 1 EACH | Refills: 0 | Status: CANCELLED | OUTPATIENT
Start: 2018-12-17 | End: 2019-12-17

## 2018-12-17 RX ORDER — INSULIN ASPART 100 [IU]/ML
12 INJECTION, SOLUTION INTRAVENOUS; SUBCUTANEOUS
Qty: 1 BOX | Refills: 0 | Status: SHIPPED | OUTPATIENT
Start: 2018-12-17 | End: 2019-03-17

## 2018-12-17 RX ORDER — METOPROLOL SUCCINATE 25 MG/1
25 TABLET, EXTENDED RELEASE ORAL DAILY
Qty: 30 TABLET | Refills: 11 | OUTPATIENT
Start: 2018-12-17 | End: 2019-12-17

## 2018-12-17 RX ORDER — METOPROLOL SUCCINATE 25 MG/1
25 TABLET, EXTENDED RELEASE ORAL DAILY
Qty: 30 TABLET | Refills: 3 | Status: SHIPPED | OUTPATIENT
Start: 2018-12-17 | End: 2019-12-17

## 2018-12-17 RX ORDER — ATORVASTATIN CALCIUM 80 MG/1
80 TABLET, FILM COATED ORAL DAILY
Qty: 30 TABLET | Refills: 2 | Status: SHIPPED | OUTPATIENT
Start: 2018-12-17 | End: 2019-12-17

## 2018-12-17 RX ORDER — INSULIN ASPART 100 [IU]/ML
12 INJECTION, SOLUTION INTRAVENOUS; SUBCUTANEOUS
Status: DISCONTINUED | OUTPATIENT
Start: 2018-12-17 | End: 2018-12-17 | Stop reason: HOSPADM

## 2018-12-17 RX ORDER — NITROGLYCERIN 0.4 MG/1
0.4 TABLET SUBLINGUAL EVERY 5 MIN PRN
Qty: 25 TABLET | Refills: 4 | Status: SHIPPED | OUTPATIENT
Start: 2018-12-17 | End: 2019-12-17

## 2018-12-17 RX ORDER — PEN NEEDLE, DIABETIC 30 GX3/16"
1 NEEDLE, DISPOSABLE MISCELLANEOUS 2 TIMES DAILY WITH MEALS
Qty: 100 EACH | Refills: 11 | Status: CANCELLED | COMMUNITY
Start: 2018-12-17

## 2018-12-17 RX ORDER — PEN NEEDLE, DIABETIC 30 GX3/16"
NEEDLE, DISPOSABLE MISCELLANEOUS
Qty: 100 EACH | Refills: 5 | Status: SHIPPED | OUTPATIENT
Start: 2018-12-17

## 2018-12-17 RX ORDER — LANCETS
EACH MISCELLANEOUS
Qty: 100 EACH | Refills: 5 | Status: SHIPPED | OUTPATIENT
Start: 2018-12-17

## 2018-12-17 RX ORDER — INSULIN ASPART 100 [IU]/ML
10 INJECTION, SOLUTION INTRAVENOUS; SUBCUTANEOUS 3 TIMES DAILY
Qty: 9 ML | Refills: 11 | OUTPATIENT
Start: 2018-12-17 | End: 2019-12-17

## 2018-12-17 RX ORDER — INSULIN PUMP SYRINGE, 3 ML
EACH MISCELLANEOUS
Qty: 1 EACH | Refills: 0 | Status: SHIPPED | OUTPATIENT
Start: 2018-12-17

## 2018-12-17 RX ORDER — LANCING DEVICE
1 EACH MISCELLANEOUS 2 TIMES DAILY WITH MEALS
Qty: 1 EACH | Refills: 0 | Status: CANCELLED | OUTPATIENT
Start: 2018-12-17 | End: 2019-12-17

## 2018-12-17 RX ORDER — NAPROXEN SODIUM 220 MG/1
81 TABLET, FILM COATED ORAL DAILY
Qty: 30 TABLET | Refills: 11 | COMMUNITY
Start: 2018-12-17 | End: 2019-12-17

## 2018-12-17 RX ORDER — INSULIN ASPART 100 [IU]/ML
7 INJECTION, SOLUTION INTRAVENOUS; SUBCUTANEOUS
Qty: 6 ML | Refills: 1 | Status: CANCELLED | OUTPATIENT
Start: 2018-12-17 | End: 2019-12-17

## 2018-12-17 RX ORDER — METOPROLOL SUCCINATE 25 MG/1
25 TABLET, EXTENDED RELEASE ORAL DAILY
Status: DISCONTINUED | OUTPATIENT
Start: 2018-12-17 | End: 2018-12-17 | Stop reason: HOSPADM

## 2018-12-17 RX ADMIN — INSULIN DETEMIR 20 UNITS: 100 INJECTION, SOLUTION SUBCUTANEOUS at 08:12

## 2018-12-17 RX ADMIN — ASPIRIN 81 MG CHEWABLE TABLET 81 MG: 81 TABLET CHEWABLE at 08:12

## 2018-12-17 RX ADMIN — HEPARIN SODIUM 5000 UNITS: 5000 INJECTION, SOLUTION INTRAVENOUS; SUBCUTANEOUS at 05:12

## 2018-12-17 RX ADMIN — METOPROLOL SUCCINATE 25 MG: 25 TABLET, EXTENDED RELEASE ORAL at 02:12

## 2018-12-17 RX ADMIN — INSULIN ASPART 10 UNITS: 100 INJECTION, SOLUTION INTRAVENOUS; SUBCUTANEOUS at 08:12

## 2018-12-17 RX ADMIN — TICAGRELOR 90 MG: 90 TABLET ORAL at 08:12

## 2018-12-17 RX ADMIN — HUMAN ALBUMIN MICROSPHERES AND PERFLUTREN 0.66 MG: 10; .22 INJECTION, SOLUTION INTRAVENOUS at 09:12

## 2018-12-17 RX ADMIN — ATORVASTATIN CALCIUM 80 MG: 20 TABLET, FILM COATED ORAL at 08:12

## 2018-12-17 RX ADMIN — INSULIN ASPART 10 UNITS: 100 INJECTION, SOLUTION INTRAVENOUS; SUBCUTANEOUS at 11:12

## 2018-12-17 RX ADMIN — INSULIN ASPART 3 UNITS: 100 INJECTION, SOLUTION INTRAVENOUS; SUBCUTANEOUS at 11:12

## 2018-12-17 NOTE — PLAN OF CARE
Problem: Adult Inpatient Plan of Care  Goal: Plan of Care Review  Outcome: Ongoing (interventions implemented as appropriate)  Patient remained free from falls/injury/trauma throughout shift. No complaints of chest pain, SOB, or discomfort. VSS. Patient to have echo in AM with possible discharge afterwards if results are ok. BG stable at 178. Plan of care reviewed with patient. Patient verbalized understanding. All questions encouraged and answered. Will continue to monitor.

## 2018-12-17 NOTE — DISCHARGE SUMMARY
[facility]  Discharge Summary      Admit Date: 12/14/2018    Discharge Date and Time:  12/17/2018 11:52 AM    Attending Physician: Fabián Mar MD     Reason for Admission: STEMI    Procedures Performed: Procedure(s) (LRB):  Left heart cath (Left)  Percutaneous coronary intervention (N/A)  Thrombectomy, Coronary  Insertion, Pacemaker, Temporary Transvenous    Hospital Course   The day after admission, patient's EKG showed 2nd degree Mobitz type 1 AVB with normal ST segments. It was thought that this was secondary to ischemia and monitored for resolution. 2D ECHO done on admission day showed EF 40% with Grade I diastolic dysfunction with local segmental wall abnormalities. The next day, repeat EKG showed persistent 2nd degree Mobitz type 1 AVB. 2D ECHO was ordered to assess for change in cardiac function. Patient denied any chest pain, SOB, nausea, or palpitations since day of admission.    Consults:   - Endocrinology  - Cardiac rehab    Review of Systems   Constitutional: Negative for chills, fever and weight loss.   Eyes: Negative for blurred vision and double vision.   Respiratory: Negative for cough, hemoptysis, sputum production, shortness of breath, wheezing and stridor.    Cardiovascular: Negative for chest pain, palpitations, claudication and leg swelling.   Gastrointestinal: Negative for abdominal pain, constipation, diarrhea, nausea and vomiting.   Genitourinary: Negative for dysuria, frequency and urgency.   Musculoskeletal: Negative for back pain.   Neurological: Negative for dizziness, tingling, weakness and headaches.   Psychiatric/Behavioral: Negative for depression.       Significant Diagnostic Studies: Labs:   CMP   Recent Labs   Lab 12/16/18  0345 12/17/18  0308   * 134*   K 4.5 3.9    99   CO2 24 25   * 141*   BUN 18 17   CREATININE 0.9 1.0   CALCIUM 9.0 8.6*   ANIONGAP 10 10   ESTGFRAFRICA >60.0 >60.0   EGFRNONAA >60.0 >60.0   , CBC   Recent Labs    Lab 12/16/18  0345 12/17/18  0308   WBC 11.09 10.54   HGB 11.5* 12.0*   HCT 33.5* 35.0*    159   , INR   Lab Results   Component Value Date    INR 1.0 12/14/2018    INR 1.1 05/29/2017   , Lipid Panel   Lab Results   Component Value Date    CHOL 202 (H) 12/15/2018    HDL 44 12/15/2018    LDLCALC 133.4 12/15/2018    TRIG 123 12/15/2018    CHOLHDL 21.8 12/15/2018   , Troponin   Recent Labs   Lab 12/15/18  0233   TROPONINI 6.012*    and A1C:   Recent Labs   Lab 12/15/18  0233   HGBA1C 9.5*     Cardiac Graphics:  ECG: Sinus tachycardia with 2nd degree A-V block (Mobitz I)   Echocardiogram (TTE)  The left ventricle is normal in size.  The estimated ejection fraction is 40%.  There is hypokinesis of the mid anteroseptum, apical anterior, apical inferior, and true apical walls.    Results for orders placed or performed during the hospital encounter of 12/14/18   Transthoracic echo (TTE) complete (Cupid Only)   Result Value Ref Range    Ascending aorta 3.09 cm    STJ 2.85 cm    AV mean gradient 3.55 mmHg    Ao peak josué 1.24 m/s    Ao VTI 20.57 cm    IVRT 0.10 msec    IVS 1.13 (A) 0.6 - 1.1 cm    LA size 3.54 cm    Left Atrium Major Axis 5.13 cm    Left Atrium Minor Axis 5.13 cm    LVIDD 4.27 3.5 - 6.0 cm    LVIDS 2.95 2.1 - 4.0 cm    LVOT diameter 2.14 cm    LVOT peak VTI 20.73 cm    PW 0.99 0.6 - 1.1 cm    MV Peak A Josué 0.56 m/s    E wave decelartion time 154.57 msec    MV Peak E Josué 0.56 m/s    RA Major Axis 4.40 cm    RA Width 4.21 cm    RVDD 3.70 cm    Sinus 2.97 cm    TAPSE 1.82 cm    TR Max Josué 1.02 m/s    TDI LATERAL 0.07     TDI SEPTAL 0.07     LA WIDTH 3.92 cm    LV Diastolic Volume 81.78 mL    LV Systolic Volume 33.66 mL    RV S' 9.35 m/s    LV LATERAL E/E' RATIO 8.00     LV SEPTAL E/E' RATIO 8.00     FS 31 %    LA volume 60.51 cm3    LV mass 152.91 g    Left Ventricle Relative Wall Thickness 0.46 cm    AV valve area 3.62 cm2    AV index (prosthetic) 1.01     E/A ratio 1.00     Mean e' 0.07     LVOT area  3.59 cm2    LVOT stroke volume 74.52 cm3    AV peak gradient 6.15 mmHg    E/E' ratio 8.00     LV Systolic Volume Index 16.0 mL/m2    LV Diastolic Volume Index 38.86 mL/m2    LA Volume Index 28.8 mL/m2    LV Mass Index 72.7 g/m2    Triscuspid Valve Regurgitation Peak Gradient 4.16 mmHg    BSA 2.13 m2    Right Atrial Pressure (from IVC) 15 mmHg    TV rest pulmonary artery pressure 19.16 mmHg       Final Diagnoses:    Principal Problem: ST elevation myocardial infarction (STEMI)   Secondary Diagnoses:   Active Hospital Problems    Diagnosis  POA    *ST elevation myocardial infarction (STEMI) [I21.3]  Yes    Diabetes mellitus without complication [E11.9]  Yes    Hyperlipidemia [E78.5]  Yes    Overweight (BMI 25.0-29.9) [E66.3]  Yes    STEMI (ST elevation myocardial infarction) [I21.3]  Yes    Wenckebach second degree AV block [I44.1]  Yes      Resolved Hospital Problems   No resolved problems to display.       Discharged Condition: stable    Disposition: Home or Self Care    Follow Up/Patient Instructions:     Medications:  Reconciled Home Medications:      Medication List      START taking these medications    aspirin 81 MG Chew  Take 1 tablet (81 mg total) by mouth once daily.     atorvastatin 80 MG tablet  Commonly known as:  LIPITOR  Take 1 tablet (80 mg total) by mouth once daily.     nitroGLYCERIN 0.4 MG SL tablet  Commonly known as:  NITROSTAT  Place 1 tablet (0.4 mg total) under the tongue every 5 (five) minutes as needed for Chest pain (for a max of 3 tabs in 15 minutes).     ticagrelor 90 mg tablet  Commonly known as:  BRILINTA  Take 1 tablet (90 mg total) by mouth 2 (two) times daily.        ASK your doctor about these medications    azithromycin 250 MG tablet  Commonly known as:  Z-FACUNDO  Take 1 tablet (250 mg total) by mouth once daily. Take first 2 tablets together, then 1 every day until finished.     diclofenac 75 MG EC tablet  Commonly known as:  VOLTAREN  Take 1 tablet (75 mg total) by mouth 2  (two) times daily.     metFORMIN 1000 MG tablet  Commonly known as:  GLUCOPHAGE  Take 1,000 mg by mouth daily with breakfast.          Discharge Procedure Orders   Cardiac rehab phase II   Standing Status: Future Standing Exp. Date: 12/17/19     Order Specific Question Answer Comments   Select qualifying diagnosis: I21.3 - ST elevation (STEMI) myocardial infarction of unspecified site

## 2018-12-17 NOTE — DISCHARGE SUMMARY
[facility]  Cardiology  Discharge Summary      Patient Name: Aj Cuellar  MRN: [id number]  Admission Date: 12/14/2018  Hospital Length of Stay: 3 days  Discharge Date and Time:  12/17/2018 1:32 PM  Attending Physician: Fabián Mar MD    Discharging Provider: Marina Ramirez MD  Primary Care Physician: Primary Doctor No    HPI:   Mr. Cuellar is a 54 yo healthy man with a family hx of CAD (father MI in 30s), family hx of HOCM (mother, 2 kids) presenting with chest pain for 2 hours at home, found to have inferior STEMI. CP was substernal, heavy, 10/10, radiating to his right arm, associated with nausea. He was emergently taken to the cath lab. There, he was found to have acute RCA occlusion. He underwent aspiration thrombecotmy, PCI/ successfully without complication. He was thereafter transferred to the ICU.     Here, he is CP free. Mild nausea. No SOB, orthopnea, SUSANA. No prior hx of ACS/MI, CHF. Nonsmoker. No known hx of DM, HLD.     Procedure(s) (LRB):  Left heart cath (Left)  Percutaneous coronary intervention (N/A)  Thrombectomy, Coronary  Insertion, Pacemaker, Temporary Transvenous     Indwelling Lines/Drains at time of discharge:  Lines/Drains/Airways          None          Hospital Course:  The day after admission, patient's EKG showed 2nd degree Mobitz type 1 AVB with normal ST segments. It was thought that this was secondary to ischemia and monitored for resolution. 2D ECHO done on admission day showed EF 40% with Grade I diastolic dysfunction with local segmental wall abnormalities. The next day, repeat EKG showed persistent 2nd degree Mobitz type 1 AVB. 2D ECHO was ordered to assess for change in cardiac function. Patient denied any chest pain, SOB, nausea, or palpitations since day of admission.    Review of Systems   Constitutional: Negative for chills, fever and weight loss.   HENT: Negative for sore throat.    Eyes: Negative for blurred vision.   Respiratory: Negative for  cough, hemoptysis, sputum production, shortness of breath and wheezing.    Cardiovascular: Negative for chest pain, palpitations, orthopnea, leg swelling and PND.   Gastrointestinal: Negative for abdominal pain, nausea and vomiting.   Genitourinary: Negative for dysuria and urgency.   Musculoskeletal: Negative for myalgias.   Neurological: Negative for dizziness and headaches.   Psychiatric/Behavioral: Negative for depression.       Consults:   Consults (From admission, onward)        Status Ordering Provider     Inpatient consult to Cardiac Rehab  Once     Provider:  (Not yet assigned)    Acknowledged DANNY BANKS     Inpatient consult to Endocrinology  Once     Provider:  (Not yet assigned)    Completed DANNY BANKS     Inpatient consult to Registered Dietitian/Nutritionist  Once     Provider:  (Not yet assigned)    Completed DANNY BANKS     Inpatient consult to Registered Dietitian/Nutritionist  Once     Provider:  (Not yet assigned)    Completed MYRA SLOAN     Inpatient consult to Social Work/Case Management  Once     Provider:  (Not yet assigned)    Acknowledged DANNY BANKS          Significant Diagnostic Studies: Labs:   CMP   Recent Labs   Lab 12/16/18  0345 12/17/18  0308   * 134*   K 4.5 3.9    99   CO2 24 25   * 141*   BUN 18 17   CREATININE 0.9 1.0   CALCIUM 9.0 8.6*   ANIONGAP 10 10   ESTGFRAFRICA >60.0 >60.0   EGFRNONAA >60.0 >60.0   , CBC   Recent Labs   Lab 12/16/18  0345 12/17/18  0308   WBC 11.09 10.54   HGB 11.5* 12.0*   HCT 33.5* 35.0*    159   , INR   Lab Results   Component Value Date    INR 1.0 12/14/2018    INR 1.1 05/29/2017   , Lipid Panel   Lab Results   Component Value Date    CHOL 202 (H) 12/15/2018    HDL 44 12/15/2018    LDLCALC 133.4 12/15/2018    TRIG 123 12/15/2018    CHOLHDL 21.8 12/15/2018   , Troponin   Recent Labs   Lab 12/15/18  0233   TROPONINI 6.012*    and A1C:   Recent Labs   Lab 12/15/18  0233   HGBA1C 9.5*        Pending Diagnostic Studies:     None          Final Active Diagnoses:    Diagnosis Date Noted POA    PRINCIPAL PROBLEM:  ST elevation myocardial infarction (STEMI) [I21.3] 12/14/2018 Yes    Diabetes mellitus without complication [E11.9] 12/15/2018 Yes    Hyperlipidemia [E78.5] 12/15/2018 Yes    Overweight (BMI 25.0-29.9) [E66.3] 12/15/2018 Yes    STEMI (ST elevation myocardial infarction) [I21.3] 12/14/2018 Yes    Wenckebach second degree AV block [I44.1] 12/14/2018 Yes      Problems Resolved During this Admission:     No new Assessment & Plan notes have been filed under this hospital service since the last note was generated.  Service: Cardiology      Discharged Condition: stable    Disposition: Home or Self Care    Follow Up:    Patient Instructions:      Cardiac rehab phase II   Standing Status: Future Standing Exp. Date: 12/17/19     Order Specific Question Answer Comments   Select qualifying diagnosis: I21.3 - ST elevation (STEMI) myocardial infarction of unspecified site      Medications:  Reconciled Home Medications:      Medication List      START taking these medications    aspirin 81 MG Chew  Take 1 tablet (81 mg total) by mouth once daily.     atorvastatin 80 MG tablet  Commonly known as:  LIPITOR  Take 1 tablet (80 mg total) by mouth once daily.     blood sugar diagnostic Strp  To check BG 4 times daily, to use with insurance preferred meter     blood-glucose meter kit  To check BG 4 times daily, to use with insurance preferred meter     insulin aspart U-100 100 unit/mL Inpn pen  Commonly known as:  NovoLOG  Inject 12 Units into the skin 3 (three) times daily with meals.     insulin detemir U-100 100 unit/mL (3 mL) Inpn pen  Commonly known as:  LEVEMIR FLEXTOUCH U-100 INSULN  Inject 20 Units into the skin every evening.     lancets Misc  To check BG 4 times daily, to use with insurance preferred meter     metoprolol succinate 25 MG 24 hr tablet  Commonly known as:  TOPROL-XL  Take 1 tablet  "(25 mg total) by mouth once daily.     nitroGLYCERIN 0.4 MG SL tablet  Commonly known as:  NITROSTAT  Place 1 tablet (0.4 mg total) under the tongue every 5 (five) minutes as needed for Chest pain (for a max of 3 tabs in 15 minutes).     pen needle, diabetic 32 gauge x 5/32" Ndle  Commonly known as:  BD ULTRA-FINE NOHEMY PEN NEEDLE  To use with Levemir and Novolog     ticagrelor 90 mg tablet  Commonly known as:  BRILINTA  Take 1 tablet (90 mg total) by mouth 2 (two) times daily.        STOP taking these medications    azithromycin 250 MG tablet  Commonly known as:  Z-FACUNDO     diclofenac 75 MG EC tablet  Commonly known as:  VOLTAREN     metFORMIN 1000 MG tablet  Commonly known as:  GLUCOPHAGE            Time spent on the discharge of patient: 30 minutes    Marina Ramirez MD  Cardiology  [facility]  "

## 2018-12-17 NOTE — PROGRESS NOTES
"[facility]  Endocrinology  Progress Note    Admit Date: 2018     Reason for Consult: Management of T2DM, Hyperglycemia     Surgical Procedure and Date: PCI for STEMI 18    Diabetes diagnosis year:     Lab Results   Component Value Date    HGBA1C 9.5 (H) 12/15/2018       Home Diabetes Medications: none, previously on Metform (stopped two years ago)    How often checking glucose at home? about once per month   BG readings on regimen: 130s-160s  Hypoglycemia on the regimen?  No  Missed doses on regimen? N/A    Diabetes Complications include: none    Complicating diabetes co morbidities:   History of MI      HPI:   Patient is a 53 y.o. male with a diagnosis of CAD and STEMI who is s/p PCI on 18.  Patient diagnosed with DM2 approximately 4 years ago, previously controlled on Metformin in combination with diet and exercise.  DM2 previously managed by PCP, Dr. Carlos Crawley.  Endocrine consulted for DM/BG management.            Interval HPI:   Overnight events: NAEON. Discharge pending cardiac Echo results 18. BG readings are above goal on current hospital SQ regimen. DM education and discharge planning reviewed with patient per RIAN Sharpe NP on 18.    Eatin%  Nausea: No  Hypoglycemia and intervention: No  Fever: No  TPN and/or TF: No  If yes, type of TF/TPN and rate: No    /75 (BP Location: Right arm, Patient Position: Lying)   Pulse 88   Temp 98 °F (36.7 °C) (Oral)   Resp 20   Ht 5' 11" (1.803 m)   Wt 89.1 kg (196 lb 6.9 oz)   SpO2 (!) 94%   BMI 27.40 kg/m²      Labs Reviewed and Include    Recent Labs   Lab 18  0308   *   CALCIUM 8.6*   *   K 3.9   CO2 25   CL 99   BUN 17   CREATININE 1.0     Lab Results   Component Value Date    WBC 10.54 2018    HGB 12.0 (L) 2018    HCT 35.0 (L) 2018    MCV 93 2018     2018     No results for input(s): TSH, FREET4 in the last 168 hours.  Lab Results "   Component Value Date    HGBA1C 9.5 (H) 12/15/2018       Nutritional status:   Body mass index is 27.4 kg/m².  Lab Results   Component Value Date    ALBUMIN 4.2 12/14/2018    ALBUMIN 3.4 (L) 05/29/2017    ALBUMIN 4.1 04/30/2010     No results found for: PREALBUMIN    Estimated Creatinine Clearance: 89.9 mL/min (based on SCr of 1 mg/dL).    Accu-Checks  Recent Labs     12/15/18  0806 12/15/18  1049 12/15/18  1648 12/15/18  2025 12/16/18  0836 12/16/18  1224 12/16/18  1744 12/16/18  2118   POCTGLUCOSE 339* 328* 270* 244* 208* 224* 228* 178*       Current Medications and/or Treatments Impacting Glycemic Control  Immunotherapy:    Immunosuppressants     None        Steroids:   Hormones (From admission, onward)    None        Pressors:    Autonomic Drugs (From admission, onward)    None        Hyperglycemia/Diabetes Medications:   Antihyperglycemics (From admission, onward)    Start     Stop Route Frequency Ordered    12/16/18 1645  insulin aspart U-100 pen 10 Units      -- SubQ 3 times daily with meals 12/16/18 1609    12/16/18 0900  insulin detemir U-100 pen 20 Units      -- SubQ Daily 12/15/18 1253    12/15/18 1352  insulin aspart U-100 pen 0-5 Units      -- SubQ Before meals & nightly PRN 12/15/18 1253          ASSESSMENT and PLAN    * ST elevation myocardial infarction (STEMI)    S/p PCI 12/14/18  Managed per primary team  Avoid hypoglycemia         Diabetes mellitus without complication    BG goal 140-180    Plan/recommendations:  Levemir 20 units daily  Continue Novolog 12 units with meals  Low dose correction scale  BG monitoring AC/HS    Discharge planning:    Reinforced education regarding hyper/hypoglycemia, how and when to treat hypoglycemia, when to hold insulin, how the correction scale works, importance of storing unused insulin in the refrigerator, and when to seek medical attention.  Patient verbalized understanding, answered all questions to patient's satisfaction. Prescriptions e-scribed per RIAN Sharpe  NP  for Levemir 20 units SQ daily, Novolog 12 units SQ TIDWM plus correction scale, pen needles, and insurance preferred BG meter/testing supplies. [facility]. Patient would like referral with [facility] endocrine provider, in the event he is not able to obtain appointment with [facility].        Overweight (BMI 25.0-29.9)    may contribute to insulin resistance  Body mass index is 27.4 kg/m².           Odell Hernandez, NP  Endocrinology  [facility]

## 2018-12-17 NOTE — PROGRESS NOTES
Consent obtained. optison given ivp via right forearm sl for imaging. Denies transfusion rxn. Tolerated well. Sl flushed before and after with 10 cc ns.

## 2018-12-17 NOTE — NURSING
Pt discharged per MD orders.  Tele and IV discontinued.  Catheter tip intact x.  Medication list and prescriptions reviewed; prescriptions sent to pt preferred pharmacy and printed prescriptions provided.  Pt verbalizes understanding of all written and verbal discharge instructions.  MIS performed and documented in chart. Pt awaiting family/escort arrival.  Will continue to monitor. Pt declined vaccines will get outpatient.

## 2018-12-17 NOTE — ASSESSMENT & PLAN NOTE
BG goal 140-180    Plan/recommendations:  Levemir 20 units daily  Continue Novolog 12 units with meals  Low dose correction scale  BG monitoring AC/HS    Discharge planning:    Reinforced education regarding hyper/hypoglycemia, how and when to treat hypoglycemia, when to hold insulin, how the correction scale works, importance of storing unused insulin in the refrigerator, and when to seek medical attention.  Patient verbalized understanding, answered all questions to patient's satisfaction. Prescriptions e-scribed per RIAN Sharpe NP  for Levemir 20 units SQ daily, Novolog 12 units SQ TIDWM plus correction scale, pen needles, and insurance preferred BG meter/testing supplies. [facility] to [facility].

## 2018-12-17 NOTE — SUBJECTIVE & OBJECTIVE
"Interval HPI:   Overnight events: NAEON. Discharge pending cardiac Echo results 18. BG readings are above goal on current hospital SQ regimen. DM education and discharge planning reviewed with patient per RIAN Sharpe NP on 18.    Eatin%  Nausea: No  Hypoglycemia and intervention: No  Fever: No  TPN and/or TF: No  If yes, type of TF/TPN and rate: No    /75 (BP Location: Right arm, Patient Position: Lying)   Pulse 88   Temp 98 °F (36.7 °C) (Oral)   Resp 20   Ht 5' 11" (1.803 m)   Wt 89.1 kg (196 lb 6.9 oz)   SpO2 (!) 94%   BMI 27.40 kg/m²     Labs Reviewed and Include    Recent Labs   Lab 18  0308   *   CALCIUM 8.6*   *   K 3.9   CO2 25   CL 99   BUN 17   CREATININE 1.0     Lab Results   Component Value Date    WBC 10.54 2018    HGB 12.0 (L) 2018    HCT 35.0 (L) 2018    MCV 93 2018     2018     No results for input(s): TSH, FREET4 in the last 168 hours.  Lab Results   Component Value Date    HGBA1C 9.5 (H) 12/15/2018       Nutritional status:   Body mass index is 27.4 kg/m².  Lab Results   Component Value Date    ALBUMIN 4.2 2018    ALBUMIN 3.4 (L) 2017    ALBUMIN 4.1 2010     No results found for: PREALBUMIN    Estimated Creatinine Clearance: 89.9 mL/min (based on SCr of 1 mg/dL).    Accu-Checks  Recent Labs     12/15/18  0806 12/15/18  1049 12/15/18  1648 12/15/18  2025 18  0836 18  1224 18  1744 18  2118   POCTGLUCOSE 339* 328* 270* 244* 208* 224* 228* 178*       Current Medications and/or Treatments Impacting Glycemic Control  Immunotherapy:    Immunosuppressants     None        Steroids:   Hormones (From admission, onward)    None        Pressors:    Autonomic Drugs (From admission, onward)    None        Hyperglycemia/Diabetes Medications:   Antihyperglycemics (From admission, onward)    Start     Stop Route Frequency Ordered    18 0573  insulin aspart U-100 pen 10 Units      -- " SubQ 3 times daily with meals 12/16/18 1609    12/16/18 0900  insulin detemir U-100 pen 20 Units      -- SubQ Daily 12/15/18 1253    12/15/18 1352  insulin aspart U-100 pen 0-5 Units      -- SubQ Before meals & nightly PRN 12/15/18 1253

## 2018-12-17 NOTE — CONSULTS
Cardiac Rehab     Aj Cuellar   [id number]   12/17/2018       Activity taught: Yes    Cardiac Rehab Phase Taught: Phase I & II    Risk Factors-Modifiable: nutrition, stress, exercise    Risk Factors-Non modifiable: age, gender    Teaching Method: Verbal, Written and Living with Heart Disease book.    Understanding/Response: Pt verbalized understanding, all questions answered. Already spoke to wife on 12/14/2018 - see previous note. Pt did not have any questions at this time.     Comments: S/P PTCA. Discussed cardiac rehab and risk factor modification. Team to refer patient to [facility] Phase II. Educational materials were used in the process and given to the patient. They included Phase Two Cardiac Rehabilitation information along with a sample Mediterranean diet.The patient expressed understanding of the teaching and expressed desire to take a role in modifying the risk factors when they return home.    GABY Manrique RN  Cardiac Rehab Nurse

## 2018-12-18 ENCOUNTER — TELEPHONE (OUTPATIENT)
Dept: ENDOCRINOLOGY | Facility: HOSPITAL | Age: 53
End: 2018-12-18

## 2018-12-19 ENCOUNTER — TELEPHONE (OUTPATIENT)
Dept: ENDOCRINOLOGY | Facility: CLINIC | Age: 53
End: 2018-12-19

## 2018-12-19 NOTE — TELEPHONE ENCOUNTER
Spoke with patient regarding a follow up appointment. Patient was provided office number to call top scheduled if he cannot get [facility]

## 2019-01-03 ENCOUNTER — TELEPHONE (OUTPATIENT)
Dept: CARDIAC REHAB | Facility: CLINIC | Age: 54
End: 2019-01-03

## 2021-08-01 NOTE — CONSULTS
[facility]  Endocrinology  Diabetes Consult Note    Consult Requested by: Fabián Mar MD   Reason for admit: ST elevation myocardial infarction (STEMI)    HISTORY OF PRESENT ILLNESS:  Reason for Consult: Management of T2DM, Hyperglycemia     Surgical Procedure and Date: PCI for STEMI 18    Diabetes diagnosis year:     Lab Results   Component Value Date    HGBA1C 9.5 (H) 12/15/2018       Home Diabetes Medications: none, previously on Metform (stopped two years ago)    How often checking glucose at home? about once per month   BG readings on regimen: 130s-160s  Hypoglycemia on the regimen?  No  Missed doses on regimen? N/A    Diabetes Complications include: none    Complicating diabetes co morbidities:   History of MI      HPI:   Patient is a 53 y.o. male with a diagnosis of CAD and STEMI who is s/p PCI on 18.  Patient diagnosed with DM2 approximately 4 years ago, previously controlled on Metformin in combination with diet and exercise.  DM2 previously managed by PCP, Dr. Carlos Crawley.  Endocrine consulted for DM/BG management.            Interval HPI:   Overnight events: Admitted yesterday, underwent PCI for STEMI.  Nausea noted overnight.  BG markedly elevated overnight.  Eatin%  Nausea: Yes  Hypoglycemia and intervention: No  Fever: No  TPN and/or TF: No    PMH, PSH, FH, SH reviewed     Review of Systems    Constitutional: Positive for weight changes, 10 lb gain over past year.  Eyes: Negative for visual disturbance, wear reading glasses.  Respiratory: Positive for cough with position changes.   Cardiovascular: Negative for chest pain.  Gastrointestinal: Positive for nausea/vomitting.  Endocrine: Negative for polyuria.  Positive for polydipsia.  Musculoskeletal: Negative for back pain.  Skin: Negative for rash.  Neurological: Negative for syncope.  Positive for vertigo.  Psychiatric/Behavioral: Negative for depression.    Current Medications and/or Treatments  Impacting Glycemic Control  Immunotherapy:    Immunosuppressants     None        Steroids:   Hormones (From admission, onward)    None        Pressors:    Autonomic Drugs (From admission, onward)    None        Hyperglycemia/Diabetes Medications:   Antihyperglycemics (From admission, onward)    Start     Stop Route Frequency Ordered    12/15/18 0900  insulin detemir U-100 pen 10 Units      -- SubQ Daily 12/15/18 0743    12/15/18 0842  insulin aspart U-100 pen 1-10 Units      -- SubQ Before meals & nightly PRN 12/15/18 0743             PHYSICAL EXAMINATION:  Vitals:    12/15/18 0900   BP: 124/81   Pulse: 77   Resp: (!) 24   Temp:      Body mass index is 27.75 kg/m².    Physical Exam     Constitutional: Well developed, well nourished, NAD.  ENT: External ears no masses with nose patent; normal hearing.  Neck: Supple; trachea midline  Cardiovascular: Normal heart sounds, no LE edema. DP +2 bilaterally.  Lungs: Normal effort; lungs anterior bilaterally clear to auscultation.  Abdomen: Soft, no masses, no hernias.  MS: No clubbing or cyanosis of nails noted; unable to assess gait.  Skin: No rashes, lesions, or ulcers; no nodules. R groin puncture site with gauze drs.  Psychiatric: Good judgement and insight; normal mood and affect.  Neurological: Cranial nerves are grossly intact.  Foot: nails in good condition, no amputations noted.      Labs Reviewed and Include   Recent Labs   Lab 12/15/18  0233   *   CALCIUM 8.7   *   K 4.6   CO2 19*      BUN 21*   CREATININE 1.3     Lab Results   Component Value Date    WBC 13.20 (H) 12/15/2018    HGB 11.4 (L) 12/15/2018    HCT 33.1 (L) 12/15/2018    MCV 92 12/15/2018     12/15/2018     No results for input(s): TSH, FREET4 in the last 168 hours.  Lab Results   Component Value Date    HGBA1C 9.5 (H) 12/15/2018       Nutritional status:   Body mass index is 27.75 kg/m².  Lab Results   Component Value Date    ALBUMIN 4.2 12/14/2018    ALBUMIN 3.4 (L)  05/29/2017    ALBUMIN 4.1 04/30/2010     No results found for: PREALBUMIN    Estimated Creatinine Clearance: 69.2 mL/min (based on SCr of 1.3 mg/dL).    Accu-Checks  Recent Labs     12/15/18  0806 12/15/18  1049   POCTGLUCOSE 339* 328*        ASSESSMENT and PLAN    * ST elevation myocardial infarction (STEMI)    S/p PCI 12/14/18  Managed per primary team  Avoid hypoglycemia         Diabetes mellitus without complication    BG goal 140-180    Levemir 10 units now  Levemir 20 units daily, first dose tomorrow  Novolog 7 units with meals  Low dose correction scale  BG monitoring AC/HS    Discharge planning:  Will discharge on SQ insulin, dosing recommendations tomorrow after evaluation insulin therapy effectiveness today.  Will provide patient with DM survival teaching.  Patient and spouse very receptive to information and motivated to get DM under control.  Patient will need urgent follow up with [facility] endocrine provider.       Overweight (BMI 25.0-29.9)    may contribute to insulin resistance  Body mass index is 27.75 kg/m².           Plan discussed with patient, family, and RN at bedside.     Ari Sharpe NP  Endocrinology  [facility]   no

## (undated) DEVICE — CATH TREK RX 2.50MM X 15MM

## (undated) DEVICE — OMNIPAQUE 350 200ML

## (undated) DEVICE — CATH JL5 4FR INFINITY

## (undated) DEVICE — GUIDE WIRE BMW 014 X190

## (undated) DEVICE — CATH 4FR SPIROFLEX-MI CUS

## (undated) DEVICE — GUIDEWIRE CHOICE PT  XS 182CM

## (undated) DEVICE — CABLE PACER

## (undated) DEVICE — Device

## (undated) DEVICE — DEVICE PERCLOSE SUT CLSR 6FR

## (undated) DEVICE — GUIDE VISTA 6FR JR 4

## (undated) DEVICE — SEE MEDLINE ITEM 156894

## (undated) DEVICE — CATH PRONTO LP ROUND 6FR 1.5MM

## (undated) DEVICE — INFLATOR ENCORE 26 BLLN INFL

## (undated) DEVICE — GUIDEWIRE SUPRA CORE 035 190CM

## (undated) DEVICE — SHEATH INTRODUCER 6FR 11CM

## (undated) DEVICE — KIT MICROINTRO 4F .018X40X7CM

## (undated) DEVICE — SPIKE CONTRAST CONTROLLER

## (undated) DEVICE — CATH PACING 6FR

## (undated) DEVICE — CATH 5FR BALLOON PT HEART PACE

## (undated) DEVICE — KIT CO-PILOT

## (undated) DEVICE — KIT CUSTOM MANIFOLD